# Patient Record
Sex: MALE | Race: WHITE | NOT HISPANIC OR LATINO | Employment: OTHER | URBAN - METROPOLITAN AREA
[De-identification: names, ages, dates, MRNs, and addresses within clinical notes are randomized per-mention and may not be internally consistent; named-entity substitution may affect disease eponyms.]

---

## 2017-12-05 ENCOUNTER — HOSPITAL ENCOUNTER (OUTPATIENT)
Dept: LAB | Facility: HOSPITAL | Age: 77
Setting detail: SPECIMEN
Discharge: HOME | End: 2017-12-05
Attending: INTERNAL MEDICINE | Admitting: INTERNAL MEDICINE

## 2017-12-05 LAB
ALBUMIN SERPL-MCNC: 3.7 G/DL (ref 3.5–4.8)
ALBUMIN/GLOB SERPL: 1.2 {RATIO} (ref 1–1.7)
ALP SERPL-CCNC: 60 IU/L (ref 32–91)
ALT SERPL-CCNC: 34 IU/L (ref 17–63)
ANION GAP SERPL CALC-SCNC: 9.8 MMOL/L (ref 10–20)
AST SERPL-CCNC: 31 IU/L (ref 15–41)
BILIRUB SERPL-MCNC: 0.7 MG/DL (ref 0.3–1.2)
BUN SERPL-MCNC: 20 MG/DL (ref 8–20)
BUN/CREAT SERPL: 22.2 (ref 6.2–20.3)
CALCIUM SERPL-MCNC: 8.9 MG/DL (ref 8.9–10.3)
CHLORIDE SERPL-SCNC: 102 MMOL/L (ref 101–111)
CHOLEST SERPL-MCNC: 156 MG/DL
CHOLEST/HDLC SERPL: 3 {RATIO}
CONV CO2: 30 MMOL/L (ref 22–32)
CONV LDL CHOLESTEROL DIRECT: 85 MG/DL (ref 0–100)
CONV MICROALBUM.,U,RANDOM: 124 MG/L
CONV TOTAL PROTEIN: 6.8 G/DL (ref 6.1–7.9)
CREAT 24H UR-MCNC: 129.6 MG/DL
CREAT UR-MCNC: 0.9 MG/DL (ref 0.7–1.2)
GLOBULIN UR ELPH-MCNC: 3.1 G/DL (ref 2.5–3.8)
GLUCOSE SERPL-MCNC: 99 MG/DL (ref 65–99)
HDLC SERPL-MCNC: 53 MG/DL
LDLC/HDLC SERPL: 1.6 {RATIO}
LIPID INTERPRETATION: NORMAL
MICROALBUMIN/CREAT UR: 95.7 UG/MG
POTASSIUM SERPL-SCNC: 3.8 MMOL/L (ref 3.6–5.1)
SODIUM SERPL-SCNC: 138 MMOL/L (ref 136–144)
TRIGL SERPL-MCNC: 106 MG/DL
TSH SERPL-ACNC: 1.31 UIU/ML (ref 0.34–5.6)
VLDLC SERPL CALC-MCNC: 17.9 MG/DL

## 2017-12-06 ENCOUNTER — CONVERSION ENCOUNTER (OUTPATIENT)
Dept: ENDOCRINOLOGY | Facility: CLINIC | Age: 77
End: 2017-12-06

## 2018-05-07 ENCOUNTER — HOSPITAL ENCOUNTER (OUTPATIENT)
Dept: LAB | Facility: HOSPITAL | Age: 78
Setting detail: SPECIMEN
Discharge: HOME | End: 2018-05-07
Attending: INTERNAL MEDICINE | Admitting: INTERNAL MEDICINE

## 2018-05-07 LAB
ALBUMIN SERPL-MCNC: 3.4 G/DL (ref 3.5–4.8)
ALBUMIN/GLOB SERPL: 1.2 {RATIO} (ref 1–1.7)
ALP SERPL-CCNC: 55 IU/L (ref 32–91)
ALT SERPL-CCNC: 27 IU/L (ref 17–63)
ANION GAP SERPL CALC-SCNC: 8.9 MMOL/L (ref 10–20)
AST SERPL-CCNC: 26 IU/L (ref 15–41)
BILIRUB SERPL-MCNC: 0.5 MG/DL (ref 0.3–1.2)
BUN SERPL-MCNC: 20 MG/DL (ref 8–20)
BUN/CREAT SERPL: 22.2 (ref 6.2–20.3)
CALCIUM SERPL-MCNC: 8.6 MG/DL (ref 8.9–10.3)
CHLORIDE SERPL-SCNC: 102 MMOL/L (ref 101–111)
CHOLEST SERPL-MCNC: 148 MG/DL
CHOLEST/HDLC SERPL: 3 {RATIO}
CONV CO2: 31 MMOL/L (ref 22–32)
CONV LDL CHOLESTEROL DIRECT: 82 MG/DL (ref 0–100)
CONV MICROALBUM.,U,RANDOM: 24 MG/L
CONV TOTAL PROTEIN: 6.2 G/DL (ref 6.1–7.9)
CREAT 24H UR-MCNC: 22.3 MG/DL
CREAT UR-MCNC: 0.9 MG/DL (ref 0.7–1.2)
GLOBULIN UR ELPH-MCNC: 2.8 G/DL (ref 2.5–3.8)
GLUCOSE SERPL-MCNC: 250 MG/DL (ref 65–99)
HDLC SERPL-MCNC: 50 MG/DL
LDLC/HDLC SERPL: 1.6 {RATIO}
LIPID INTERPRETATION: NORMAL
MICROALBUMIN/CREAT UR: 107.6 UG/MG
POTASSIUM SERPL-SCNC: 3.9 MMOL/L (ref 3.6–5.1)
SODIUM SERPL-SCNC: 138 MMOL/L (ref 136–144)
TRIGL SERPL-MCNC: 91 MG/DL
VLDLC SERPL CALC-MCNC: 16.6 MG/DL

## 2018-05-14 ENCOUNTER — CONVERSION ENCOUNTER (OUTPATIENT)
Dept: ENDOCRINOLOGY | Facility: CLINIC | Age: 78
End: 2018-05-14

## 2018-08-22 ENCOUNTER — CONVERSION ENCOUNTER (OUTPATIENT)
Dept: ENDOCRINOLOGY | Facility: CLINIC | Age: 78
End: 2018-08-22

## 2018-08-22 ENCOUNTER — HOSPITAL ENCOUNTER (OUTPATIENT)
Dept: LAB | Facility: HOSPITAL | Age: 78
Setting detail: SPECIMEN
Discharge: HOME | End: 2018-08-22
Attending: INTERNAL MEDICINE | Admitting: INTERNAL MEDICINE

## 2018-08-22 LAB — GLUCOSE SERPL-MCNC: 154 MG/DL (ref 65–99)

## 2018-10-10 ENCOUNTER — CONVERSION ENCOUNTER (OUTPATIENT)
Dept: ENDOCRINOLOGY | Facility: CLINIC | Age: 78
End: 2018-10-10

## 2019-01-02 ENCOUNTER — CONVERSION ENCOUNTER (OUTPATIENT)
Dept: ENDOCRINOLOGY | Facility: CLINIC | Age: 79
End: 2019-01-02

## 2019-06-04 VITALS
HEART RATE: 61 BPM | HEIGHT: 68 IN | BODY MASS INDEX: 35.16 KG/M2 | SYSTOLIC BLOOD PRESSURE: 122 MMHG | DIASTOLIC BLOOD PRESSURE: 58 MMHG | HEART RATE: 65 BPM | BODY MASS INDEX: 35.77 KG/M2 | OXYGEN SATURATION: 95 % | DIASTOLIC BLOOD PRESSURE: 55 MMHG | HEART RATE: 72 BPM | DIASTOLIC BLOOD PRESSURE: 50 MMHG | BODY MASS INDEX: 34.56 KG/M2 | WEIGHT: 236 LBS | DIASTOLIC BLOOD PRESSURE: 52 MMHG | SYSTOLIC BLOOD PRESSURE: 132 MMHG | SYSTOLIC BLOOD PRESSURE: 120 MMHG | HEIGHT: 68 IN | OXYGEN SATURATION: 99 % | WEIGHT: 242 LBS | SYSTOLIC BLOOD PRESSURE: 112 MMHG | DIASTOLIC BLOOD PRESSURE: 78 MMHG | HEART RATE: 63 BPM | SYSTOLIC BLOOD PRESSURE: 122 MMHG | OXYGEN SATURATION: 98 % | WEIGHT: 228 LBS | HEART RATE: 58 BPM | HEIGHT: 68 IN | WEIGHT: 232 LBS | OXYGEN SATURATION: 98 % | WEIGHT: 234 LBS | BODY MASS INDEX: 35.46 KG/M2 | HEIGHT: 68 IN

## 2019-07-01 DIAGNOSIS — I10 HYPERTENSION, UNSPECIFIED TYPE: ICD-10-CM

## 2019-07-01 DIAGNOSIS — E55.9 VITAMIN D DEFICIENCY: ICD-10-CM

## 2019-07-01 DIAGNOSIS — E10.49 TYPE 1 DIABETES MELLITUS WITH OTHER DIABETIC NEUROLOGICAL COMPLICATION (HCC): Primary | ICD-10-CM

## 2019-07-01 DIAGNOSIS — E78.2 HYPERLIPIDEMIA, MIXED: ICD-10-CM

## 2019-07-01 PROBLEM — Z82.5 FAMILY HISTORY OF ASTHMA: Status: ACTIVE | Noted: 2019-07-01

## 2019-07-08 ENCOUNTER — TELEPHONE (OUTPATIENT)
Dept: ENDOCRINOLOGY | Facility: CLINIC | Age: 79
End: 2019-07-08

## 2019-07-09 ENCOUNTER — LAB (OUTPATIENT)
Dept: LAB | Facility: HOSPITAL | Age: 79
End: 2019-07-09

## 2019-07-09 DIAGNOSIS — E10.49 TYPE 1 DIABETES MELLITUS WITH OTHER DIABETIC NEUROLOGICAL COMPLICATION (HCC): ICD-10-CM

## 2019-07-09 DIAGNOSIS — E78.2 HYPERLIPIDEMIA, MIXED: ICD-10-CM

## 2019-07-09 DIAGNOSIS — I10 HYPERTENSION, UNSPECIFIED TYPE: ICD-10-CM

## 2019-07-09 DIAGNOSIS — E55.9 VITAMIN D DEFICIENCY: ICD-10-CM

## 2019-07-09 LAB
ALBUMIN SERPL-MCNC: 3.6 G/DL (ref 3.5–4.8)
ALBUMIN/GLOB SERPL: 1.2 G/DL (ref 1–1.7)
ALP SERPL-CCNC: 57 U/L (ref 32–91)
ALT SERPL W P-5'-P-CCNC: 30 U/L (ref 17–63)
ANION GAP SERPL CALCULATED.3IONS-SCNC: 14.4 MMOL/L (ref 5–15)
ARTICHOKE IGE QN: 64 MG/DL (ref 0–100)
AST SERPL-CCNC: 26 U/L (ref 15–41)
BILIRUB SERPL-MCNC: 1 MG/DL (ref 0.3–1.2)
BUN BLD-MCNC: 20 MG/DL (ref 8–20)
BUN/CREAT SERPL: 20 (ref 6.2–20.3)
CALCIUM SPEC-SCNC: 9 MG/DL (ref 8.9–10.3)
CHLORIDE SERPL-SCNC: 101 MMOL/L (ref 101–111)
CHOLEST SERPL-MCNC: 116 MG/DL
CO2 SERPL-SCNC: 27 MMOL/L (ref 22–32)
CREAT BLD-MCNC: 1 MG/DL (ref 0.7–1.2)
GFR SERPL CREATININE-BSD FRML MDRD: 72 ML/MIN/1.73
GLOBULIN UR ELPH-MCNC: 3.1 GM/DL (ref 2.5–3.8)
GLUCOSE BLD-MCNC: 286 MG/DL (ref 65–99)
HBA1C MFR BLD: 7.6 % (ref 3.5–5.6)
HDLC SERPL QL: 2.37
HDLC SERPL-MCNC: 49 MG/DL
LDLC/HDLC SERPL: 1.11 {RATIO}
POTASSIUM BLD-SCNC: 4.4 MMOL/L (ref 3.6–5.1)
PROT SERPL-MCNC: 6.7 G/DL (ref 6.1–7.9)
SODIUM BLD-SCNC: 138 MMOL/L (ref 136–144)
TRIGL SERPL-MCNC: 62 MG/DL
TSH SERPL DL<=0.05 MIU/L-ACNC: 1.18 MIU/ML (ref 0.34–5.6)
VLDLC SERPL-MCNC: 12.4 MG/DL

## 2019-07-09 PROCEDURE — 80061 LIPID PANEL: CPT | Performed by: INTERNAL MEDICINE

## 2019-07-09 PROCEDURE — 80053 COMPREHEN METABOLIC PANEL: CPT | Performed by: INTERNAL MEDICINE

## 2019-07-09 PROCEDURE — 84443 ASSAY THYROID STIM HORMONE: CPT | Performed by: INTERNAL MEDICINE

## 2019-07-09 PROCEDURE — 36415 COLL VENOUS BLD VENIPUNCTURE: CPT

## 2019-07-09 PROCEDURE — 83036 HEMOGLOBIN GLYCOSYLATED A1C: CPT | Performed by: INTERNAL MEDICINE

## 2019-07-09 PROCEDURE — 82306 VITAMIN D 25 HYDROXY: CPT | Performed by: INTERNAL MEDICINE

## 2019-07-10 LAB — 25(OH)D3 SERPL-MCNC: 93.7 NG/ML (ref 30–100)

## 2019-07-15 ENCOUNTER — OFFICE VISIT (OUTPATIENT)
Dept: ENDOCRINOLOGY | Facility: CLINIC | Age: 79
End: 2019-07-15

## 2019-07-15 VITALS
SYSTOLIC BLOOD PRESSURE: 128 MMHG | OXYGEN SATURATION: 97 % | HEART RATE: 58 BPM | DIASTOLIC BLOOD PRESSURE: 58 MMHG | WEIGHT: 224 LBS | BODY MASS INDEX: 35.16 KG/M2 | HEIGHT: 67 IN

## 2019-07-15 DIAGNOSIS — E78.2 HYPERLIPIDEMIA, MIXED: ICD-10-CM

## 2019-07-15 DIAGNOSIS — E10.49 TYPE 1 DIABETES MELLITUS WITH OTHER DIABETIC NEUROLOGICAL COMPLICATION (HCC): Primary | ICD-10-CM

## 2019-07-15 DIAGNOSIS — E10.42 DIABETIC PERIPHERAL NEUROPATHY ASSOCIATED WITH TYPE 1 DIABETES MELLITUS (HCC): ICD-10-CM

## 2019-07-15 DIAGNOSIS — E55.9 VITAMIN D DEFICIENCY: ICD-10-CM

## 2019-07-15 PROBLEM — E10.9: Status: ACTIVE | Noted: 2017-07-05

## 2019-07-15 PROBLEM — I50.30 DIASTOLIC HEART FAILURE: Status: ACTIVE | Noted: 2017-07-05

## 2019-07-15 PROBLEM — M62.3 IMMOBILITY SYNDROME: Status: ACTIVE | Noted: 2017-07-05

## 2019-07-15 PROBLEM — Z98.61 S/P PTCA (PERCUTANEOUS TRANSLUMINAL CORONARY ANGIOPLASTY): Status: ACTIVE | Noted: 2017-07-03

## 2019-07-15 PROBLEM — R80.9 MICROALBUMINURIA: Status: ACTIVE | Noted: 2018-10-22

## 2019-07-15 PROBLEM — J45.20 MILD INTERMITTENT ASTHMA WITHOUT COMPLICATION: Status: ACTIVE | Noted: 2018-10-22

## 2019-07-15 PROBLEM — R10.10 UPPER ABDOMINAL PAIN: Status: ACTIVE | Noted: 2018-05-06

## 2019-07-15 PROBLEM — Z97.3 WEARS GLASSES: Status: ACTIVE | Noted: 2017-07-05

## 2019-07-15 PROCEDURE — 99214 OFFICE O/P EST MOD 30 MIN: CPT | Performed by: INTERNAL MEDICINE

## 2019-07-15 RX ORDER — TAMSULOSIN HYDROCHLORIDE 0.4 MG/1
1 CAPSULE ORAL DAILY
COMMUNITY

## 2019-07-15 RX ORDER — ASPIRIN 325 MG
81 TABLET ORAL DAILY
COMMUNITY
End: 2020-01-22

## 2019-07-15 RX ORDER — ATORVASTATIN CALCIUM 40 MG/1
40 TABLET, FILM COATED ORAL DAILY
COMMUNITY
End: 2021-08-16 | Stop reason: SDUPTHER

## 2019-07-15 RX ORDER — ESOMEPRAZOLE MAGNESIUM 40 MG/1
40 CAPSULE, DELAYED RELEASE ORAL 2 TIMES DAILY
COMMUNITY

## 2019-07-15 RX ORDER — FUROSEMIDE 40 MG/1
40 TABLET ORAL DAILY
COMMUNITY

## 2019-07-15 RX ORDER — ERGOCALCIFEROL 1.25 MG/1
50000 CAPSULE ORAL WEEKLY
COMMUNITY
End: 2020-01-06 | Stop reason: SDUPTHER

## 2019-07-15 RX ORDER — BUDESONIDE 0.5 MG/2ML
0.5 INHALANT ORAL 2 TIMES DAILY
COMMUNITY

## 2019-07-15 RX ORDER — HYDRALAZINE HYDROCHLORIDE 50 MG/1
50 TABLET, FILM COATED ORAL 3 TIMES DAILY
COMMUNITY

## 2019-07-15 RX ORDER — ALBUTEROL SULFATE 90 UG/1
2 AEROSOL, METERED RESPIRATORY (INHALATION) AS NEEDED
COMMUNITY
End: 2021-05-17

## 2019-07-15 RX ORDER — NITROGLYCERIN 400 UG/1
1 SPRAY ORAL
COMMUNITY
End: 2022-02-23 | Stop reason: SDUPTHER

## 2019-07-15 NOTE — PROGRESS NOTES
[facility]        Patient Care Team:  Diane Garcia MD as PCP - General    Chief Complaint:    Chief Complaint   Patient presents with   • Diabetes     type 1         Subjective   Here for diabetes f/u  Blood sugars higher since on steroids for COPD exacerbation.  Checks blood sugars 4x/d for the last 3 months. Adjusts insulin dose based on results.  Injects insulin 4x/d for the last 6 months.  Exercise program: using walker  Taking vit D 2d/week.    Interval History:     Patient Complaints: doesn't like the small needle - difficult to use  Patient Denies: hypoglycemia  History taken from: patient    Review of Systems:   Review of Systems   Eyes: Negative for blurred vision.   Gastrointestinal: Negative for nausea.   Endocrine: Negative for polyuria.   Neurological: Negative for headache.     Lost  4 lb since last visit    Objective     Vital Signs  Heart Rate:  [58] 58  BP: (128)/(58) 128/58    Physical Exam:     General Appearance:    Alert, cooperative, in no acute distress. Obese   Head:    Normocephalic, without obvious abnormality, atraumatic   Eyes:            Lids and lashes normal, conjunctivae and sclerae normal, no   icterus, no pallor, corneas clear, PERRLA   Throat:   No oral lesions,  oral mucosa moist   Neck:   No adenopathy, supple,  no thyromegaly, no   carotid bruit   Lungs:     decreased breath sounds    Heart:    Regular rhythm and normal rate   Chest Wall:    No abnormalities observed   Abdomen:     Normal bowel sounds, soft                 Extremities:   Moves all extremities well, trace edema, charcot feet, plantar calluses, hammer toes. Atrophy of intrinsic hand muscles               Pulses:   Pulses palpable and equal bilaterally   Skin:   Dry.    Neurologic:  DTR absent, unable to feel the 10g monofilament          Results Review:    I have reviewed the patient's new clinical results, labs & imaging.    Medication Review:   Prior to Admission medications     Medication Sig Start Date End Date Taking? Authorizing Provider   albuterol sulfate  (90 Base) MCG/ACT inhaler Inhale 2 puffs As Needed for Wheezing.   Yes Noni Torrez MD   aspirin 325 MG tablet Take 81 mg by mouth Daily.   Yes Noni Torrez MD   atorvastatin (LIPITOR) 20 MG tablet Take 20 mg by mouth Daily.   Yes Noni Torrez MD   budesonide (PULMICORT) 0.5 MG/2ML nebulizer solution Take 0.5 mg by nebulization 3 (Three) Times a Day.   Yes Noni Torrez MD   esomeprazole (nexIUM) 40 MG capsule Take 40 mg by mouth 2 (Two) Times a Day.   Yes Noni Torrez MD   furosemide (LASIX) 40 MG tablet Take 40 mg by mouth Daily.   Yes Noni Torrez MD   glucose blood test strip 1 each by Other route As Needed (one touch verio test strips  6 times daily). Use as instructed   Yes Noni Torrez MD   hydrALAZINE (APRESOLINE) 50 MG tablet Take 50 mg by mouth 2 (Two) Times a Day.   Yes Noni Torrez MD   Insulin Glargine (LANTUS SOLOSTAR) 100 UNIT/ML injection pen Inject 37 Units under the skin into the appropriate area as directed Every Night. 7/8/19  Yes Diane Garcia MD   Insulin Glargine (LANTUS SOLOSTAR) 100 UNIT/ML injection pen Inject 36 Units under the skin into the appropriate area as directed Daily.   Yes Noni Torrez MD   Insulin Lispro (HUMALOG KWIKPEN) 100 UNIT/ML solution pen-injector Sliding scale max daily dose 75units 1/23/15  Yes Noni Torrez MD   Insulin Pen Needle (ULTILET PEN NEEDLE) 29G X 12.7MM misc    Yes Noni Torrez MD   insulin regular (humuLIN R,novoLIN R) 100 UNIT/ML injection Inject  under the skin into the appropriate area as directed 3 (Three) Times a Day Before Meals. 3 units for each 10 mg of prednisone   Yes Noni Torrez MD   isosorbide mononitrate (ISMO,MONOKET) 20 MG tablet Take 20 mg by mouth Daily.   Yes Noni Torrez MD   metoprolol tartrate (LOPRESSOR) 25 MG tablet Take  25 mg by mouth 2 (Two) Times a Day.   Yes Noni Torrez MD   nitroglycerin (NITROLINGUAL) 0.4 MG/SPRAY spray Place 1 spray under the tongue Every 5 (Five) Minutes As Needed for Chest Pain (1 spray every 5 min as needed).   Yes Noni Torrez MD   tamsulosin (FLOMAX) 0.4 MG capsule 24 hr capsule Take 1 capsule by mouth Daily.   Yes Noni Torrez MD   tiotropium (SPIRIVA) 18 MCG per inhalation capsule Place 1 capsule into inhaler and inhale Daily. Two times inhalation once daily   Yes Noni Torrez MD   vitamin D (ERGOCALCIFEROL) 22051 units capsule capsule Take 50,000 Units by mouth 1 (One) Time Per Week.   Yes Noni Torrez MD   Insulin Pen Needle 29G X 12.7MM misc Use with insulin 6 times daily 7/15/19   Diane Garcia MD       Lab Results (most recent)     None            Lab Results   Component Value Date    HGBA1C 7.6 (H) 07/09/2019    HGBA1C 7.7 (H) 06/11/2019    HGBA1C 7.4 (H) 12/26/2018      Lab Results   Component Value Date    GLUCOSE 286 (H) 07/09/2019    BUN 20 07/09/2019    CREATININE 1.00 07/09/2019    EGFRIFNONA 72 07/09/2019    BCR 20.0 07/09/2019    K 4.4 07/09/2019    CO2 27.0 07/09/2019    CALCIUM 9.0 07/09/2019    ALBUMIN 3.60 07/09/2019    LABIL2 1.4 06/11/2019    AST 26 07/09/2019    ALT 30 07/09/2019    CHOL 116 07/09/2019    LDL 64 07/09/2019    HDL 49 07/09/2019    TRIG 62 07/09/2019     Lab Results   Component Value Date    TSH 1.180 07/09/2019    TSEQ44BI 93.7 07/09/2019       Assessment/Plan     Jered was seen today for diabetes.    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  Decrease vit D to once a week.        Diane Garcia MD  07/15/19  11:52 AM

## 2019-07-15 NOTE — PATIENT INSTRUCTIONS
Keep up the good work!  Increase Humulin R insulin dose to 3 units per 10 mg.  Decrease vit D to once a week.  F/u in 6 months, with labs prior.

## 2019-07-19 RX ORDER — INSULIN GLARGINE 100 [IU]/ML
39 INJECTION, SOLUTION SUBCUTANEOUS NIGHTLY
Qty: 45 ML | Refills: 3 | Status: SHIPPED | OUTPATIENT
Start: 2019-07-19 | End: 2020-06-08

## 2019-07-19 RX ORDER — INSULIN LISPRO 100 [IU]/ML
INJECTION, SOLUTION INTRAVENOUS; SUBCUTANEOUS
Qty: 60 ML | Refills: 3 | Status: SHIPPED | OUTPATIENT
Start: 2019-07-19 | End: 2020-10-09

## 2019-07-19 RX ORDER — LANCETS
EACH MISCELLANEOUS
Qty: 400 EACH | Refills: 3 | Status: SHIPPED | OUTPATIENT
Start: 2019-07-19 | End: 2019-08-01 | Stop reason: SDUPTHER

## 2019-08-01 RX ORDER — LANCETS
EACH MISCELLANEOUS
Qty: 400 EACH | Refills: 3 | Status: SHIPPED | OUTPATIENT
Start: 2019-08-01 | End: 2019-08-08

## 2019-08-02 RX ORDER — BLOOD-GLUCOSE METER
EACH MISCELLANEOUS
Qty: 1 KIT | Refills: 3 | Status: SHIPPED | OUTPATIENT
Start: 2019-08-02 | End: 2019-08-06 | Stop reason: CLARIF

## 2019-08-02 NOTE — TELEPHONE ENCOUNTER
Pharmacy was confused on what has been sent in vs what the patient had requested. Clarified with pharmacy and sent in.

## 2019-08-06 RX ORDER — LANCETS
EACH MISCELLANEOUS
Qty: 150 EACH | Refills: 6 | Status: SHIPPED | OUTPATIENT
Start: 2019-08-06 | End: 2019-08-08

## 2019-08-08 ENCOUNTER — TELEPHONE (OUTPATIENT)
Dept: ENDOCRINOLOGY | Facility: CLINIC | Age: 79
End: 2019-08-08

## 2019-08-08 RX ORDER — LANCETS
EACH MISCELLANEOUS
Qty: 150 EACH | Refills: 5 | Status: SHIPPED | OUTPATIENT
Start: 2019-08-08 | End: 2020-07-27

## 2019-08-28 ENCOUNTER — TELEPHONE (OUTPATIENT)
Dept: ENDOCRINOLOGY | Facility: CLINIC | Age: 79
End: 2019-08-28

## 2019-08-28 NOTE — TELEPHONE ENCOUNTER
Spoke with patient about smoothies he has been making that were running BG up. After he stopped them yesterday his BG better. Asking if it was smoothie. We discussed ingredients and suggested he try the berries or banana and peanut butter but not all the ingredients all in same soothie as this had elevated sugar. He agreed and was going to try different combo of items to see.

## 2019-09-10 ENCOUNTER — TELEPHONE (OUTPATIENT)
Dept: ENDOCRINOLOGY | Facility: CLINIC | Age: 79
End: 2019-09-10

## 2019-09-23 ENCOUNTER — TELEPHONE (OUTPATIENT)
Dept: ENDOCRINOLOGY | Facility: CLINIC | Age: 79
End: 2019-09-23

## 2019-10-08 ENCOUNTER — TELEPHONE (OUTPATIENT)
Dept: ENDOCRINOLOGY | Facility: CLINIC | Age: 79
End: 2019-10-08

## 2019-10-15 ENCOUNTER — TELEPHONE (OUTPATIENT)
Dept: ENDOCRINOLOGY | Facility: CLINIC | Age: 79
End: 2019-10-15

## 2019-11-01 ENCOUNTER — TELEPHONE (OUTPATIENT)
Dept: ENDOCRINOLOGY | Facility: CLINIC | Age: 79
End: 2019-11-01

## 2019-11-11 ENCOUNTER — TELEPHONE (OUTPATIENT)
Dept: ENDOCRINOLOGY | Facility: CLINIC | Age: 79
End: 2019-11-11

## 2019-11-12 ENCOUNTER — TELEPHONE (OUTPATIENT)
Dept: ENDOCRINOLOGY | Facility: CLINIC | Age: 79
End: 2019-11-12

## 2019-11-14 RX ORDER — PROCHLORPERAZINE 25 MG/1
1 SUPPOSITORY RECTAL CONTINUOUS
Qty: 1 EACH | Refills: 3 | Status: SHIPPED | OUTPATIENT
Start: 2019-11-14 | End: 2019-12-13 | Stop reason: SDUPTHER

## 2019-11-14 RX ORDER — PROCHLORPERAZINE 25 MG/1
1 SUPPOSITORY RECTAL
Qty: 3 EACH | Refills: 3 | Status: SHIPPED | OUTPATIENT
Start: 2019-11-14 | End: 2019-12-13 | Stop reason: SDUPTHER

## 2019-11-14 RX ORDER — PROCHLORPERAZINE 25 MG/1
1 SUPPOSITORY RECTAL CONTINUOUS
Qty: 1 DEVICE | Refills: 0 | Status: SHIPPED | OUTPATIENT
Start: 2019-11-14 | End: 2019-12-13 | Stop reason: SDUPTHER

## 2019-11-14 RX ORDER — PROCHLORPERAZINE 25 MG/1
1 SUPPOSITORY RECTAL
COMMUNITY
End: 2019-11-14 | Stop reason: SDUPTHER

## 2019-12-02 ENCOUNTER — TELEPHONE (OUTPATIENT)
Dept: ENDOCRINOLOGY | Facility: CLINIC | Age: 79
End: 2019-12-02

## 2019-12-04 ENCOUNTER — TELEPHONE (OUTPATIENT)
Dept: ENDOCRINOLOGY | Facility: CLINIC | Age: 79
End: 2019-12-04

## 2019-12-04 NOTE — TELEPHONE ENCOUNTER
Pt called to clarify his Humulin R dose for steroids.  His MD put pt on Prednisone 5 mg tabs instead of 10 mg tabs.  Pt emailed attempted dosing but adjusted per MD s/o to 1 unit of Humulin R for every 5 mg tab of prednisone.  See scanned attachment from email from pt.  Pt states his insurance does not cover Humulin R so he goes to [employer] and purchases OOP.

## 2019-12-09 ENCOUNTER — TELEPHONE (OUTPATIENT)
Dept: ENDOCRINOLOGY | Facility: CLINIC | Age: 79
End: 2019-12-09

## 2019-12-10 ENCOUNTER — TELEPHONE (OUTPATIENT)
Dept: ENDOCRINOLOGY | Facility: CLINIC | Age: 79
End: 2019-12-10

## 2019-12-10 NOTE — TELEPHONE ENCOUNTER
Called pt. He says he is not sure why but didn't get the email from us.  Instructed patient to give 33 units Lantus pm and give 4units Lantus in am on Steroid days. He agreed to do this tonight as he had already done 35 last pm and 2 this am.  States he hopes to upgrade to Dexcom G6 in January and would like to come in for training then.  To call when receives the new supplies

## 2019-12-13 RX ORDER — PROCHLORPERAZINE 25 MG/1
1 SUPPOSITORY RECTAL CONTINUOUS
Qty: 1 DEVICE | Refills: 0 | Status: SHIPPED | OUTPATIENT
Start: 2019-12-13 | End: 2020-01-20

## 2019-12-13 RX ORDER — PROCHLORPERAZINE 25 MG/1
1 SUPPOSITORY RECTAL
Qty: 3 EACH | Refills: 3 | Status: SHIPPED | OUTPATIENT
Start: 2019-12-13 | End: 2020-01-20

## 2019-12-13 RX ORDER — PROCHLORPERAZINE 25 MG/1
1 SUPPOSITORY RECTAL CONTINUOUS
Qty: 1 EACH | Refills: 3 | Status: SHIPPED | OUTPATIENT
Start: 2019-12-13 | End: 2020-01-20

## 2019-12-13 NOTE — TELEPHONE ENCOUNTER
Dexcom G6 supplies sent to [facility] since he doesn't deal with [facility]  Pt notified.

## 2019-12-16 NOTE — TELEPHONE ENCOUNTER
[facility] faxed a PA request for pt's Dexcom supplies.  This means pt has to go through a DME instead of a local pharmacy.

## 2019-12-23 ENCOUNTER — TELEPHONE (OUTPATIENT)
Dept: ENDOCRINOLOGY | Facility: CLINIC | Age: 79
End: 2019-12-23

## 2019-12-31 ENCOUNTER — TELEPHONE (OUTPATIENT)
Dept: ENDOCRINOLOGY | Facility: CLINIC | Age: 79
End: 2019-12-31

## 2020-01-03 ENCOUNTER — TELEPHONE (OUTPATIENT)
Dept: ENDOCRINOLOGY | Facility: CLINIC | Age: 80
End: 2020-01-03

## 2020-01-03 NOTE — TELEPHONE ENCOUNTER
Pt called c/o lack of appetite due to being on steroids for asthma. Discussed using a liquid/soft diet such as a homemade smoothie or pudding, Jell-O, applesauce, yogurt, etc for meals and sip on non-calorie beverages between.  Pt states he will probably use his homemade smoothies that have almond milk, 1/2 banana, PNB and low carb yogurt.

## 2020-01-06 RX ORDER — ERGOCALCIFEROL 1.25 MG/1
50000 CAPSULE ORAL WEEKLY
Qty: 12 CAPSULE | Refills: 3 | Status: SHIPPED | OUTPATIENT
Start: 2020-01-06 | End: 2020-01-09 | Stop reason: SDUPTHER

## 2020-01-06 RX ORDER — CHOLECALCIFEROL (VITAMIN D3) 1250 MCG
CAPSULE ORAL
Qty: 24 CAPSULE | Refills: 0 | Status: SHIPPED | OUTPATIENT
Start: 2020-01-06 | End: 2020-01-16

## 2020-01-08 ENCOUNTER — TELEPHONE (OUTPATIENT)
Dept: ENDOCRINOLOGY | Facility: CLINIC | Age: 80
End: 2020-01-08

## 2020-01-09 RX ORDER — ERGOCALCIFEROL 1.25 MG/1
50000 CAPSULE ORAL WEEKLY
Qty: 12 CAPSULE | Refills: 3 | Status: SHIPPED | OUTPATIENT
Start: 2020-01-09 | End: 2020-07-27 | Stop reason: SDUPTHER

## 2020-01-09 RX ORDER — MULTIVIT WITH MINERALS/LUTEIN
1000 TABLET ORAL DAILY
COMMUNITY
Start: 2015-11-18

## 2020-01-09 RX ORDER — LOSARTAN POTASSIUM 100 MG/1
100 TABLET ORAL DAILY
COMMUNITY
Start: 2019-05-23 | End: 2020-01-22

## 2020-01-09 RX ORDER — IBUPROFEN 200 MG
TABLET ORAL
COMMUNITY
Start: 2018-12-28 | End: 2020-01-23 | Stop reason: SDUPTHER

## 2020-01-09 RX ORDER — POTASSIUM CHLORIDE 750 MG/1
TABLET, FILM COATED, EXTENDED RELEASE ORAL DAILY
COMMUNITY
Start: 2015-11-18

## 2020-01-09 NOTE — TELEPHONE ENCOUNTER
Pt called again asking about his BG's that he emailed.  Replied to pt's email that Kirsten rec'd no change. Transferred VM about his Vit. D to Katherine.

## 2020-01-11 ENCOUNTER — RESULTS ENCOUNTER (OUTPATIENT)
Dept: ENDOCRINOLOGY | Facility: CLINIC | Age: 80
End: 2020-01-11

## 2020-01-11 DIAGNOSIS — E10.49 TYPE 1 DIABETES MELLITUS WITH OTHER DIABETIC NEUROLOGICAL COMPLICATION (HCC): ICD-10-CM

## 2020-01-15 ENCOUNTER — TELEPHONE (OUTPATIENT)
Dept: ENDOCRINOLOGY | Facility: CLINIC | Age: 80
End: 2020-01-15

## 2020-01-15 DIAGNOSIS — E10.65 TYPE 1 DIABETES MELLITUS WITH HYPERGLYCEMIA (HCC): Primary | ICD-10-CM

## 2020-01-15 NOTE — TELEPHONE ENCOUNTER
Pt emailed recent BG's.  Per MD s/o, instructed no changes.  Pt states he will be getting the Dexcom G6 soon and will need to schedule training.  Scheduled for 1/28 at 1 pm.

## 2020-01-16 RX ORDER — CHOLECALCIFEROL (VITAMIN D3) 1250 MCG
CAPSULE ORAL
Qty: 24 CAPSULE | Refills: 0 | Status: SHIPPED | OUTPATIENT
Start: 2020-01-16 | End: 2021-01-18

## 2020-01-20 RX ORDER — PROCHLORPERAZINE 25 MG/1
1 SUPPOSITORY RECTAL CONTINUOUS
Qty: 1 DEVICE | Refills: 0 | Status: SHIPPED | OUTPATIENT
Start: 2020-01-20 | End: 2023-02-21 | Stop reason: SDUPTHER

## 2020-01-20 RX ORDER — PROCHLORPERAZINE 25 MG/1
1 SUPPOSITORY RECTAL CONTINUOUS
Qty: 1 EACH | Refills: 3 | Status: SHIPPED | OUTPATIENT
Start: 2020-01-20 | End: 2020-12-14

## 2020-01-20 RX ORDER — PROCHLORPERAZINE 25 MG/1
1 SUPPOSITORY RECTAL
Qty: 3 EACH | Refills: 3 | Status: SHIPPED | OUTPATIENT
Start: 2020-01-20 | End: 2020-10-05

## 2020-01-22 ENCOUNTER — OFFICE VISIT (OUTPATIENT)
Dept: ENDOCRINOLOGY | Facility: CLINIC | Age: 80
End: 2020-01-22

## 2020-01-22 VITALS
HEART RATE: 66 BPM | SYSTOLIC BLOOD PRESSURE: 126 MMHG | WEIGHT: 217 LBS | BODY MASS INDEX: 34.06 KG/M2 | HEIGHT: 67 IN | OXYGEN SATURATION: 97 % | DIASTOLIC BLOOD PRESSURE: 68 MMHG

## 2020-01-22 DIAGNOSIS — E78.2 HYPERLIPIDEMIA, MIXED: ICD-10-CM

## 2020-01-22 DIAGNOSIS — E10.49 TYPE 1 DIABETES MELLITUS WITH OTHER DIABETIC NEUROLOGICAL COMPLICATION (HCC): Primary | ICD-10-CM

## 2020-01-22 DIAGNOSIS — E55.9 VITAMIN D DEFICIENCY: ICD-10-CM

## 2020-01-22 DIAGNOSIS — E66.2 CLASS 2 OBESITY WITH ALVEOLAR HYPOVENTILATION, SERIOUS COMORBIDITY, AND BODY MASS INDEX (BMI) OF 37.0 TO 37.9 IN ADULT (HCC): ICD-10-CM

## 2020-01-22 PROCEDURE — 99214 OFFICE O/P EST MOD 30 MIN: CPT | Performed by: INTERNAL MEDICINE

## 2020-01-22 RX ORDER — FORMOTEROL FUMARATE 20 UG/2ML
SOLUTION RESPIRATORY (INHALATION)
COMMUNITY

## 2020-01-22 RX ORDER — POTASSIUM CHLORIDE 20 MEQ/1
20 TABLET, EXTENDED RELEASE ORAL DAILY
COMMUNITY
Start: 2020-01-14 | End: 2020-01-22

## 2020-01-22 RX ORDER — HYDRALAZINE HYDROCHLORIDE 25 MG/1
25 TABLET, FILM COATED ORAL 2 TIMES DAILY
COMMUNITY
Start: 2019-11-27 | End: 2020-01-23 | Stop reason: SDUPTHER

## 2020-01-22 RX ORDER — IPRATROPIUM BROMIDE AND ALBUTEROL SULFATE 2.5; .5 MG/3ML; MG/3ML
1 SOLUTION RESPIRATORY (INHALATION)
COMMUNITY

## 2020-01-22 NOTE — PATIENT INSTRUCTIONS
Keep up the good work!  Increase exercise as able.  Decrease vit D to every other week.  Continue other meds.  Call if blood sugars are running under 100 or over 200.  F/u in 6 months, with labs prior.

## 2020-01-23 NOTE — PROGRESS NOTES
[facility]        Patient Care Team:  Diane Garcia MD as PCP - General    Chief Complaint:    Chief Complaint   Patient presents with   • Diabetes     type 1 - pt refused BS- dexcom shows 174         Subjective   Here for diabetes f/u  Blood sugars: in the high 100's  Checks blood sugars 4x/d for the last 3 months. Adjusts insulin dose per sliding scale based on results  Injects insulin 4x/d for the last 6 months  Exercise program: up & down steps    Interval History:     Patient Complaints: none  Patient Denies:  hypoglycemia  History taken from: patient    Review of Systems:   Review of Systems   Eyes: Negative for blurred vision.   Gastrointestinal: Negative for nausea.   Endocrine: Negative for polyuria.   Neurological: Negative for headache.     Lost  7 lb since last visit    Objective     Vital Signs      Vitals:    01/22/20 1043   BP: 126/68   Pulse: 66   SpO2: 97%         Physical Exam:     General Appearance:    Alert, cooperative, in no acute distress. Obese   Head:    Normocephalic, without obvious abnormality, atraumatic   Eyes:            Lids and lashes normal, conjunctivae and sclerae normal, no   icterus, no pallor, corneas clear, PERRLA   Throat:   No oral lesions,  oral mucosa moist   Neck:   No adenopathy, supple,  no thyromegaly, no   carotid bruit   Lungs:     Decreased breath sounds    Heart:    Regular rhythm and normal rate   Chest Wall:    No abnormalities observed   Abdomen:     Normal bowel sounds, soft                 Extremities:   Charcot feet, hammer toes, trace edema               Pulses:   Pulses palpable and equal bilaterally   Skin:   Dry. Stasis dermatitis.   Neurologic:  DTR absent in toes, absent vibratory sense in toes, unable to feel the 10g monofilament ( same as 1y ago )            Results Review:    I have reviewed the patient's new clinical results, labs & imaging.    Medication Review:   Prior to Admission medications    Medication Sig Start  Date End Date Taking? Authorizing Provider   ACCU-CHEK COMPACT TEST DRUM test strip Use as instructed to check blood sugar four times daily dx:E10.65 8/6/19  Yes Diane Garcia MD   ACCU-CHEK SOFTCLIX LANCETS lancets Use to check blood sugar four times daily dx: e10.65 8/8/19 8/7/20 Yes Diane Garcia MD   acetone, urine, test (KETOSTIX) strip KETOSTIX STRP 8/2/18  Yes Noni Torrez MD   albuterol sulfate  (90 Base) MCG/ACT inhaler Inhale 2 puffs As Needed for Wheezing.   Yes Noni Torrez MD   ascorbic acid (VITAMIN C) 1000 MG tablet Take 1,000 mg by mouth Daily. 11/18/15  Yes Noni Torrez MD   atorvastatin (LIPITOR) 40 MG tablet Take 40 mg by mouth Daily.   Yes Noni Torrez MD   Blood Glucose Monitoring Suppl (ACCU-CHEK COMPACT CARE KIT) kit Use to check blood sugars four times daily dx:e10.65 8/6/19  Yes Diane Garcia MD   budesonide (PULMICORT) 0.5 MG/2ML nebulizer solution Take 0.5 mg by nebulization 2 (Two) Times a Day.   Yes Noni Torrez MD   Cholecalciferol (VITAMIN D3) 1.25 MG (86027 UT) capsule TAKE 1 CAPSULE BY MOUTH TWICE A WEEK 1/16/20  Yes Diane Garcia MD   Continuous Blood Gluc  (DEXCOM G6 ) device 1 each Continuous. Pt to use to monitor his blood sugars. 1/20/20  Yes Diane Garcia MD   Continuous Blood Gluc Sensor (DEXCOM G6 SENSOR) 1 each Every 10 (Ten) Days. Pt to replace sensor every 10 days. 1/20/20  Yes Diane Garcia MD   Continuous Blood Gluc Transmit (DEXCOM G6 TRANSMITTER) misc 1 each Continuous. Pt to use to monitor his blood sugars 1/20/20  Yes Diane Garcia MD   esomeprazole (nexIUM) 40 MG capsule Take 40 mg by mouth 2 (Two) Times a Day.   Yes Noni Torrez MD   fluticasone (VERAMYST) 27.5 MCG/SPRAY nasal spray 2 sprays into the nostril(s) as directed by provider Daily. 12/26/19 12/25/20 Yes Noni Torrez, MD   formoterol (PERFOROMIST) 20 MCG/2ML nebulizer solution Take  by  nebulization 2 (Two) Times a Day.   Yes Noni Torrez MD   furosemide (LASIX) 40 MG tablet Take 40 mg by mouth Daily.   Yes Noni Torrez MD   glucagon (GLUCAGEN HYPOKIT) 1 MG injection GLUCAGEN HYPOKIT 1 MG SOLR 9/20/18  Yes Noni Torrez MD   glucose blood test strip Use to check 4 times daily Dx code: E10.65 8/6/19  Yes Diane Garcia MD   HUMALOG KWIKPEN 100 UNIT/ML solution pen-injector Sliding scale max daily dose 55 units dx:e10.65  Patient taking differently: Inject per Sliding scale five shots daily , max daily dose 55 units dx:e10.65 7/19/19  Yes Diane Garcia MD   hydrALAZINE (APRESOLINE) 50 MG tablet Take 50 mg by mouth 3 (Three) Times a Day.   Yes Noni Torrez MD   Insulin Pen Needle 29G X 12.7MM misc Use with insulin 6 times daily  Patient taking differently: Use with insulin 7 times daily 7/15/19  Yes Diane Garcia MD   ipratropium-albuterol (DUO-NEB) 0.5-2.5 mg/3 ml nebulizer Inhale 1 ampule.   Yes Noni Torrez MD   ISOSORBIDE MONONITRATE PO Take 30 mg by mouth Daily.   Yes Noni Torrez MD   LANTUS SOLOSTAR 100 UNIT/ML injection pen Inject 39 Units under the skin into the appropriate area as directed Every Night. Inject 40 units at bedtime dx:e10.65  Patient taking differently: Inject 34 Units under the skin into the appropriate area as directed Every Night. dx:e10.65 7/19/19  Yes Diane Garcia MD   metoprolol tartrate (LOPRESSOR) 25 MG tablet Take 25 mg by mouth 2 (Two) Times a Day.   Yes Noni Torrez MD   nitroglycerin (NITROLINGUAL) 0.4 MG/SPRAY spray Place 1 spray under the tongue Every 5 (Five) Minutes As Needed for Chest Pain (1 spray every 5 min as needed).   Yes Noni oTrrez MD   potassium chloride (K-DUR) 10 MEQ CR tablet Daily. 11/18/15  Yes Noni Torrez MD   tamsulosin (FLOMAX) 0.4 MG capsule 24 hr capsule Take 1 capsule by mouth Daily.   Yes Noni Torrez MD   tiotropium (SPIRIVA) 18  "MCG per inhalation capsule Place 1 capsule into inhaler and inhale Daily. Two times inhalation once daily   Yes Noni Torrez MD   vitamin D (ERGOCALCIFEROL) 1.25 MG (99082 UT) capsule capsule Take 1 capsule by mouth 1 (One) Time Per Week. 1/9/20  Yes Diane Garcia MD   hydrALAZINE (APRESOLINE) 25 MG tablet Take 25 mg by mouth 2 (Two) Times a Day. 11/27/19   Noni Torrez MD   Insulin Pen Needle (ULTILET PEN NEEDLE) 29G X 12.7MM misc     Noni Torrez MD   Insulin Syringe (B-D INS SYR ULTRAFINE .5CC/29G) 29G X 1/2\" 0.5 ML misc BD INSULIN SYRINGE ULTRAFINE 29G X 1/2\" 0.5 ML 12/28/18   Noni Torrez MD       Lab Results (most recent)     None            Lab Results   Component Value Date    HGBA1C 7.7 (H) 01/15/2020    HGBA1C 7.6 (H) 07/09/2019    HGBA1C 7.7 (H) 06/11/2019      Lab Results   Component Value Date    GLUCOSE 286 (H) 07/09/2019    BUN 24 (H) 01/15/2020    CREATININE 0.8 01/15/2020    EGFRIFNONA 72 07/09/2019    BCR 29.0 (H) 01/15/2020    K 4.4 01/15/2020    CO2 26 01/15/2020    CALCIUM 9.1 01/15/2020    ALBUMIN 3.6 01/15/2020    LABIL2 1.2 01/15/2020    AST 41 01/15/2020    ALT 33 01/15/2020    CHOL 116 07/09/2019    LDL 64 01/15/2020    HDL 52 01/15/2020    TRIG 72 01/15/2020     Lab Results   Component Value Date    TSH 1.180 07/09/2019    [id number] >60 01/15/2020      TSH 0.9; microalb/cr [id number]; Chol 130, Trigl 72 on 1/15/2020    Assessment/Plan     Jered was seen today for diabetes.    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    Increase exercise as able.  Decrease vit D to every other week.  Continue other meds.  Call if blood sugars are running under 100 or over 200.          Diane Garcia MD  01/23/20  5:12 PM

## 2020-01-28 ENCOUNTER — OFFICE VISIT (OUTPATIENT)
Dept: ENDOCRINOLOGY | Facility: CLINIC | Age: 80
End: 2020-01-28

## 2020-01-28 DIAGNOSIS — E10.65 TYPE 1 DIABETES MELLITUS WITH HYPERGLYCEMIA (HCC): ICD-10-CM

## 2020-01-28 PROCEDURE — G0108 DIAB MANAGE TRN  PER INDIV: HCPCS | Performed by: DIETITIAN, REGISTERED

## 2020-02-10 ENCOUNTER — TELEPHONE (OUTPATIENT)
Dept: ENDOCRINOLOGY | Facility: CLINIC | Age: 80
End: 2020-02-10

## 2020-02-11 NOTE — TELEPHONE ENCOUNTER
Pt called back and said he did not get the email from [employer]. Reviewed email recommendations and pt asked he could change his ICR of 70 to 90 grams range to 1 unit to 3 grams also.  Pt asked if he could continue to email BG's on spreadsheet instead of using Dexcom clarity.  Pt has not been able to get that clementina on his phone.  He either does not know his apple ID and/or Dexcom acct username and password. Agreed with the above.

## 2020-02-24 ENCOUNTER — TELEPHONE (OUTPATIENT)
Dept: ENDOCRINOLOGY | Facility: CLINIC | Age: 80
End: 2020-02-24

## 2020-03-09 ENCOUNTER — TELEPHONE (OUTPATIENT)
Dept: ENDOCRINOLOGY | Facility: CLINIC | Age: 80
End: 2020-03-09

## 2020-03-09 NOTE — TELEPHONE ENCOUNTER
Pt emailed recent BG's.  Pt plans on going to fitness center 3 days per week.  Pt states he did not take his Humalog with breakfast before going to gym and he did not have a low BG. Told pt he is notices elevated BG after exercise, he may have to take a small amount of Humalog with breakfast.  Pt asked about other low BG treatments.  Discussed using 4-6 oz of grape juice.  Pt can't tolerated OJ and does not think a regular soda helps.  Told pt a treatment may still take up to 10-15 minutes to work.  Pt verbalized an understanding.

## 2020-04-09 ENCOUNTER — TELEPHONE (OUTPATIENT)
Dept: ENDOCRINOLOGY | Facility: CLINIC | Age: 80
End: 2020-04-09

## 2020-04-23 ENCOUNTER — TELEPHONE (OUTPATIENT)
Dept: ENDOCRINOLOGY | Facility: CLINIC | Age: 80
End: 2020-04-23

## 2020-04-27 ENCOUNTER — TELEPHONE (OUTPATIENT)
Dept: ENDOCRINOLOGY | Facility: CLINIC | Age: 80
End: 2020-04-27

## 2020-04-27 NOTE — TELEPHONE ENCOUNTER
Called pt and per MD s/o, instructed no changes.  Pt verbalized an understanding. BG's scanned into phone note

## 2020-05-08 ENCOUNTER — TELEPHONE (OUTPATIENT)
Dept: ENDOCRINOLOGY | Facility: CLINIC | Age: 80
End: 2020-05-08

## 2020-05-14 ENCOUNTER — TELEPHONE (OUTPATIENT)
Dept: ENDOCRINOLOGY | Facility: CLINIC | Age: 80
End: 2020-05-14

## 2020-05-14 NOTE — TELEPHONE ENCOUNTER
Pt states egg substitutes are getting too expensive and wanted to know if it's okay to eat 2 eggs per day.  Called pt back and told pt it's okay to eat 2 eggs a day.  His lipid panel is well managed.

## 2020-05-22 ENCOUNTER — TELEPHONE (OUTPATIENT)
Dept: ENDOCRINOLOGY | Facility: CLINIC | Age: 80
End: 2020-05-22

## 2020-05-22 NOTE — TELEPHONE ENCOUNTER
Scanned BG's into phone note.  Per MD s/o, instructed no changes.  Pt verbalized an understanding.

## 2020-06-08 ENCOUNTER — TELEPHONE (OUTPATIENT)
Dept: ENDOCRINOLOGY | Facility: CLINIC | Age: 80
End: 2020-06-08

## 2020-06-08 RX ORDER — INSULIN GLARGINE 100 [IU]/ML
INJECTION, SOLUTION SUBCUTANEOUS
Qty: 30 ML | Refills: 3 | Status: SHIPPED | OUTPATIENT
Start: 2020-06-08 | End: 2021-07-28

## 2020-06-08 NOTE — TELEPHONE ENCOUNTER
Updated chart with how much pt is taking of his Lantus.  BG's scanned into phone note.  Per MD s/o, changed his ICR from 30-49 grams to 1 unit for every 3.5 grams.

## 2020-07-10 ENCOUNTER — TELEPHONE (OUTPATIENT)
Dept: ENDOCRINOLOGY | Facility: CLINIC | Age: 80
End: 2020-07-10

## 2020-07-20 ENCOUNTER — RESULTS ENCOUNTER (OUTPATIENT)
Dept: ENDOCRINOLOGY | Facility: CLINIC | Age: 80
End: 2020-07-20

## 2020-07-20 DIAGNOSIS — E55.9 VITAMIN D DEFICIENCY: ICD-10-CM

## 2020-07-20 DIAGNOSIS — E10.49 TYPE 1 DIABETES MELLITUS WITH OTHER DIABETIC NEUROLOGICAL COMPLICATION (HCC): ICD-10-CM

## 2020-07-27 ENCOUNTER — OFFICE VISIT (OUTPATIENT)
Dept: ENDOCRINOLOGY | Facility: CLINIC | Age: 80
End: 2020-07-27

## 2020-07-27 VITALS
HEIGHT: 68 IN | HEART RATE: 65 BPM | TEMPERATURE: 98 F | SYSTOLIC BLOOD PRESSURE: 110 MMHG | BODY MASS INDEX: 32.46 KG/M2 | DIASTOLIC BLOOD PRESSURE: 50 MMHG | OXYGEN SATURATION: 98 % | WEIGHT: 214.2 LBS

## 2020-07-27 DIAGNOSIS — E10.49 TYPE 1 DIABETES MELLITUS WITH OTHER DIABETIC NEUROLOGICAL COMPLICATION (HCC): Primary | ICD-10-CM

## 2020-07-27 DIAGNOSIS — E55.9 VITAMIN D DEFICIENCY: ICD-10-CM

## 2020-07-27 DIAGNOSIS — I10 ESSENTIAL HYPERTENSION: ICD-10-CM

## 2020-07-27 DIAGNOSIS — E78.5 HYPERLIPIDEMIA, UNSPECIFIED HYPERLIPIDEMIA TYPE: ICD-10-CM

## 2020-07-27 PROBLEM — N18.1 CKD STAGE 1 DUE TO TYPE 1 DIABETES MELLITUS (HCC): Status: ACTIVE | Noted: 2018-10-22

## 2020-07-27 PROBLEM — Z78.9 ENROLLED IN CHRONIC CARE MANAGEMENT: Status: ACTIVE | Noted: 2020-06-30

## 2020-07-27 PROBLEM — J45.909 ASTHMA: Status: ACTIVE | Noted: 2019-07-01

## 2020-07-27 PROBLEM — E10.22 CKD STAGE 1 DUE TO TYPE 1 DIABETES MELLITUS (HCC): Status: ACTIVE | Noted: 2018-10-22

## 2020-07-27 PROCEDURE — 99214 OFFICE O/P EST MOD 30 MIN: CPT | Performed by: INTERNAL MEDICINE

## 2020-07-27 RX ORDER — LANCETS
EACH MISCELLANEOUS
Qty: 100 EACH | Refills: 3 | Status: SHIPPED | OUTPATIENT
Start: 2020-07-27 | End: 2020-12-14 | Stop reason: CLARIF

## 2020-07-27 RX ORDER — LOSARTAN POTASSIUM 100 MG/1
100 TABLET ORAL DAILY
COMMUNITY
Start: 2020-03-09

## 2020-07-27 RX ORDER — FINASTERIDE 1 MG/1
1 TABLET, FILM COATED ORAL DAILY
COMMUNITY
Start: 2020-05-26 | End: 2021-05-26

## 2020-07-27 RX ORDER — PANTOPRAZOLE SODIUM 40 MG/1
TABLET, DELAYED RELEASE ORAL
COMMUNITY
Start: 2020-07-21

## 2020-07-27 NOTE — PATIENT INSTRUCTIONS
Decrease vit D to one every other week.  Increase exercise as able.  Ok to use meal substitute for 2 meals a day.  Continue meds.  Call if blood sugars are running under 100 or over 200.  F/u in 6 months, with labs prior.

## 2020-07-28 NOTE — PROGRESS NOTES
[facility]        Patient Care Team:  Diane Garcia MD as PCP - General    Chief Complaint:    Chief Complaint   Patient presents with   • Diabetes     TYPE 1 - (FASTING) 194 (2 POST MEAL)         Subjective   Here for diabetes f/u  Blood sugars: in the high 100's  Exercise program: not much, hasn't been going to the gym  New new meter    Interval History:     Patient Complaints: none  Patient Denies:  hypoglycemia  History taken from: patient    Review of Systems:   Review of Systems   Eyes: Negative for blurred vision.   Gastrointestinal: Negative for nausea.   Endocrine: Negative for polyuria.   Neurological: Negative for headache.     Lost  3 lb since last visit    Objective     Vital Signs  Temp:  [98 °F (36.7 °C)] 98 °F (36.7 °C)  Heart Rate:  [65] 65  BP: (110)/(50) 110/50    Physical Exam:     General Appearance:    Alert, cooperative, in no acute distress. Obese   Head:    Normocephalic, without obvious abnormality, atraumatic   Eyes:            Lids and lashes normal, conjunctivae and sclerae normal, no   icterus, no pallor, corneas clear, PERRLA   Throat:   No oral lesions,  oral mucosa moist   Neck:   No adenopathy, supple,  no thyromegaly, no   carotid bruit   Lungs:     Clear     Heart:    Regular rhythm and normal rate   Chest Wall:    No abnormalities observed   Abdomen:     Normal bowel sounds, soft                 Extremities:   Hammer toes, no edema               Pulses:   Pulses palpable and equal bilaterally   Skin:   Dry. Toe nails need trimming   Neurologic:  DTR absent, able to feel the 10g monofilament          Results Review:    I have reviewed the patient's new clinical results, labs & imaging.    Medication Review:   Prior to Admission medications    Medication Sig Start Date End Date Taking? Authorizing Provider   acetone, urine, test (KETOSTIX) strip KETOSTIX STRP 8/2/18  Yes Provider, MD Noni   albuterol sulfate  (90 Base) MCG/ACT inhaler  Inhale 2 puffs As Needed for Wheezing.   Yes Noni Torrez MD   ascorbic acid (VITAMIN C) 1000 MG tablet Take 1,000 mg by mouth Daily. 11/18/15  Yes Noni Torrez MD   Aspirin Buf,CaCarb-MgCarb-MgO, 81 MG tablet Take 81 mg by mouth Daily.   Yes Noni Torrez MD   atorvastatin (LIPITOR) 40 MG tablet Take 40 mg by mouth Daily.   Yes Noni Torrez MD   budesonide (PULMICORT) 0.5 MG/2ML nebulizer solution Take 0.5 mg by nebulization 2 (Two) Times a Day.   Yes Noni Torrez MD   Cholecalciferol (VITAMIN D3) 1.25 MG (63405 UT) capsule TAKE 1 CAPSULE BY MOUTH TWICE A WEEK 1/16/20  Yes Diane Garcia MD   Continuous Blood Gluc  (DEXCOM G6 ) device 1 each Continuous. Pt to use to monitor his blood sugars. 1/20/20  Yes Diane Garcia MD   Continuous Blood Gluc Sensor (DEXCOM G6 SENSOR) 1 each Every 10 (Ten) Days. Pt to replace sensor every 10 days. 1/20/20  Yes Diane Garcia MD   Continuous Blood Gluc Transmit (DEXCOM G6 TRANSMITTER) misc 1 each Continuous. Pt to use to monitor his blood sugars 1/20/20  Yes Diane Garcia MD   esomeprazole (nexIUM) 40 MG capsule Take 40 mg by mouth 2 (Two) Times a Day.   Yes Noni Torrez MD   finasteride (PROPECIA) 1 MG tablet Take 1 mg by mouth Daily. 5/26/20 5/26/21 Yes Noni Torrez MD   fluticasone (VERAMYST) 27.5 MCG/SPRAY nasal spray 2 sprays into the nostril(s) as directed by provider Daily. 12/26/19 12/25/20 Yes Noni Torrez MD   formoterol (PERFOROMIST) 20 MCG/2ML nebulizer solution Take  by nebulization 2 (Two) Times a Day.   Yes Noni Torrez MD   furosemide (LASIX) 40 MG tablet Take 40 mg by mouth Daily.   Yes Noni Torrez MD   glucagon (GLUCAGEN HYPOKIT) 1 MG injection GLUCAGEN HYPOKIT 1 MG SOLR 9/20/18  Yes Noni Torrez MD   HUMALOG KWIKPEN 100 UNIT/ML solution pen-injector Sliding scale max daily dose 55 units dx:e10.65  Patient taking differently: Inject  per Sliding scale five shots daily , max daily dose 55 units dx:e10.65 7/19/19  Yes Diane Garcia MD   hydrALAZINE (APRESOLINE) 50 MG tablet Take 50 mg by mouth 3 (Three) Times a Day.   Yes Noni Torrez MD   Insulin Pen Needle 29G X 12.7MM misc Use with insulin 6 times daily  Patient taking differently: Use with insulin 7 times daily 7/15/19  Yes Diane Garcia MD   ipratropium-albuterol (DUO-NEB) 0.5-2.5 mg/3 ml nebulizer Inhale 1 ampule.   Yes Noni Torrez MD   ISOSORBIDE MONONITRATE PO Take 30 mg by mouth Daily.   Yes Noni Torrez MD   LANTUS SOLOSTAR 100 UNIT/ML injection pen Inject 29 units at bedtime dx:e10.65  Patient taking differently: Inject UP TO 40 units at bedtime dx:e10.65 6/8/20  Yes Diane Garcia MD   losartan (COZAAR) 100 MG tablet Take 100 mg by mouth Daily. 3/9/20  Yes Noni Torrez MD   metoprolol tartrate (LOPRESSOR) 25 MG tablet Take 25 mg by mouth 2 (Two) Times a Day.   Yes Noni Torrez MD   pantoprazole (PROTONIX) 40 MG EC tablet Take 1 tablet by mouth twice daily 7/21/20  Yes Noni Torrez MD   potassium chloride (K-DUR) 10 MEQ CR tablet Daily. 11/18/15  Yes Noni Torrez MD   tamsulosin (FLOMAX) 0.4 MG capsule 24 hr capsule Take 1 capsule by mouth Daily.   Yes Noni Torrez MD   tiotropium (SPIRIVA) 18 MCG per inhalation capsule Place 1 capsule into inhaler and inhale Daily. Two times inhalation once daily   Yes Noni Torrez MD   glucose blood test strip Use to check 4 times daily Dx code: E10.65 8/6/19 7/27/20 Yes Diane Garcia MD   Accu-Chek FastClix Lancets misc For finger stick daily 7/27/20   Diane Garcia MD   Blood Glucose Monitoring Suppl (ACCU-CHEK COMPACT CARE KIT) kit Use to check blood sugars four times daily dx:e10.65 8/6/19   Diane Garcia MD   glucose blood (Accu-Chek Guide) test strip To test blood sugar daily 7/27/20   Diane Garcia MD   nitroglycerin  (NITROLINGUAL) 0.4 MG/SPRAY spray Place 1 spray under the tongue Every 5 (Five) Minutes As Needed for Chest Pain (1 spray every 5 min as needed).    Provider, MD Noni   ACCU-CHEK COMPACT TEST DRUM test strip Use as instructed to check blood sugar four times daily dx:E10.65 8/6/19 7/27/20  Diane Garcia MD   ACCU-CHEK SOFTCLIX LANCETS lancets Use to check blood sugar four times daily dx: e10.65 8/8/19 7/27/20  Diane Garcia MD   vitamin D (ERGOCALCIFEROL) 1.25 MG (29993 UT) capsule capsule Take 1 capsule by mouth 1 (One) Time Per Week. 1/9/20 7/27/20  Diane Garcia MD       Lab Results (most recent)     None        Lab Results   Component Value Date    HGBA1C 8.2 (H) 07/21/2020    HGBA1C 7.7 (H) 01/15/2020    HGBA1C 7.6 (H) 07/09/2019      Lab Results   Component Value Date    GLUCOSE 286 (H) 07/09/2019    BUN 40 (H) 07/21/2020    CREATININE 0.9 07/21/2020    EGFRIFNONA 72 07/09/2019    BCR 44.0 (H) 07/21/2020    K 4.6 07/21/2020    CO2 27 07/21/2020    CALCIUM 9.1 07/21/2020    ALBUMIN 3.6 01/15/2020    LABIL2 1.2 01/15/2020    AST 41 01/15/2020    ALT 33 01/15/2020    CHOL 116 07/09/2019    LDL 95 07/21/2020    HDL 55 07/21/2020    TRIG 109 07/21/2020     Lab Results   Component Value Date    TSH 1.180 07/09/2019    PHMS01OT >60 07/21/2020       Assessment/Plan     Jered was seen today for diabetes.    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  Increase exercise as able.  Ok to use meal substitute for 2 meals a day.  Continue meds.  Call if blood sugars are running under 100 or over 200.    This pt  has type 1 Diabetes.  He injects insulin 5x/d.  This patient's insulin treatment regimen requires frequent adjustments by the patient on the basis of therapeutic CGM testing results.    Diane Garcia MD  07/27/20  21:22

## 2020-08-03 ENCOUNTER — TELEPHONE (OUTPATIENT)
Dept: ENDOCRINOLOGY | Facility: CLINIC | Age: 80
End: 2020-08-03

## 2020-08-03 NOTE — TELEPHONE ENCOUNTER
Can you please addend the OV on 07/27/20 to state:  1. Pt's diagnosis  2. How often they are administering insulin  3. The patient's insulin treatment regimen requires frequent adjustments by the patient on the basis of therapeutic cgm testing results      Thank you!

## 2020-09-08 ENCOUNTER — TELEPHONE (OUTPATIENT)
Dept: ENDOCRINOLOGY | Facility: CLINIC | Age: 80
End: 2020-09-08

## 2020-10-01 DIAGNOSIS — E10.49 TYPE 1 DIABETES MELLITUS WITH OTHER DIABETIC NEUROLOGICAL COMPLICATION (HCC): Primary | ICD-10-CM

## 2020-10-02 RX ORDER — PEN NEEDLE, DIABETIC 29 G X1/2"
NEEDLE, DISPOSABLE MISCELLANEOUS
Qty: 600 EACH | Refills: 2 | Status: SHIPPED | OUTPATIENT
Start: 2020-10-02 | End: 2021-07-19

## 2020-10-05 ENCOUNTER — TELEPHONE (OUTPATIENT)
Dept: ENDOCRINOLOGY | Facility: CLINIC | Age: 80
End: 2020-10-05

## 2020-10-05 RX ORDER — PROCHLORPERAZINE 25 MG/1
1 SUPPOSITORY RECTAL
Qty: 3 EACH | Refills: 3 | Status: SHIPPED | OUTPATIENT
Start: 2020-10-05 | End: 2020-10-05 | Stop reason: SDUPTHER

## 2020-10-05 RX ORDER — PROCHLORPERAZINE 25 MG/1
1 SUPPOSITORY RECTAL
Qty: 3 EACH | Refills: 3 | Status: SHIPPED | OUTPATIENT
Start: 2020-10-05 | End: 2020-10-14 | Stop reason: SDUPTHER

## 2020-10-07 ENCOUNTER — TELEPHONE (OUTPATIENT)
Dept: ENDOCRINOLOGY | Facility: CLINIC | Age: 80
End: 2020-10-07

## 2020-10-07 NOTE — TELEPHONE ENCOUNTER
Pt states Medicare is still not allowing him to get his Dexcom sensors through [employer] on [address].  Emailed Dexcom reps asking if I need to call medicare [phone] or if that is something Dex[employer] can do.

## 2020-10-08 ENCOUNTER — TELEPHONE (OUTPATIENT)
Dept: ENDOCRINOLOGY | Facility: CLINIC | Age: 80
End: 2020-10-08

## 2020-10-09 RX ORDER — INSULIN LISPRO 100 [IU]/ML
INJECTION, SOLUTION INTRAVENOUS; SUBCUTANEOUS
Qty: 60 ML | Refills: 0 | Status: SHIPPED | OUTPATIENT
Start: 2020-10-09 | End: 2021-02-02

## 2020-10-14 DIAGNOSIS — E10.49 TYPE 1 DIABETES MELLITUS WITH OTHER DIABETIC NEUROLOGICAL COMPLICATION (HCC): Primary | ICD-10-CM

## 2020-10-14 RX ORDER — PROCHLORPERAZINE 25 MG/1
SUPPOSITORY RECTAL
Qty: 3 EACH | Refills: 3 | Status: SHIPPED | OUTPATIENT
Start: 2020-10-14 | End: 2021-01-11 | Stop reason: SDUPTHER

## 2020-11-13 ENCOUNTER — TELEPHONE (OUTPATIENT)
Dept: ENDOCRINOLOGY | Facility: CLINIC | Age: 80
End: 2020-11-13

## 2020-11-13 NOTE — TELEPHONE ENCOUNTER
Patient emailed in blood sugars. Patient states they are from Oct. No changes made. Asked patient to send in more recent blood sugars. Blood Sugars scanned into phone note.

## 2020-11-17 ENCOUNTER — TELEPHONE (OUTPATIENT)
Dept: ENDOCRINOLOGY | Facility: CLINIC | Age: 80
End: 2020-11-17

## 2020-11-17 RX ORDER — URINE ACETONE TEST STRIPS
STRIP MISCELLANEOUS
Qty: 50 STRIP | Refills: 1 | Status: SHIPPED | OUTPATIENT
Start: 2020-11-17

## 2020-11-17 RX ORDER — ONDANSETRON HYDROCHLORIDE 4 MG/5ML
4 SOLUTION ORAL ONCE
COMMUNITY
End: 2020-11-17

## 2020-11-17 RX ORDER — ONDANSETRON HYDROCHLORIDE 4 MG/1
4 TABLET, FILM COATED ORAL EVERY 8 HOURS PRN
COMMUNITY
End: 2020-11-17

## 2020-12-14 DIAGNOSIS — E10.65 UNCONTROLLED TYPE 1 DIABETES MELLITUS WITH HYPERGLYCEMIA (HCC): Primary | ICD-10-CM

## 2020-12-14 RX ORDER — CARVEDILOL 25 MG/1
1 TABLET, FILM COATED ORAL 3 TIMES DAILY
COMMUNITY
End: 2020-12-14 | Stop reason: SDUPTHER

## 2020-12-14 RX ORDER — PROCHLORPERAZINE 25 MG/1
SUPPOSITORY RECTAL
Qty: 1 EACH | Refills: 3 | Status: SHIPPED | OUTPATIENT
Start: 2020-12-14 | End: 2021-12-30

## 2020-12-14 RX ORDER — CARVEDILOL 25 MG/1
1 TABLET, FILM COATED ORAL 3 TIMES DAILY
Qty: 90 EACH | Refills: 3 | Status: SHIPPED | OUTPATIENT
Start: 2020-12-14 | End: 2021-01-18

## 2020-12-14 NOTE — TELEPHONE ENCOUNTER
Pt has had to call Dexcom a lot ready about failed sensors and transmitter. Pt having to calibrate twice daily.  Pt switched his glucometer to [employer] and asking for test strip rx. Rx ready for signature.

## 2020-12-18 PROBLEM — E10.3299 MILD NONPROLIFERATIVE DIABETIC RETINOPATHY WITHOUT MACULAR EDEMA ASSOCIATED WITH TYPE 1 DIABETES MELLITUS: Status: ACTIVE | Noted: 2020-12-18

## 2020-12-31 ENCOUNTER — TELEPHONE (OUTPATIENT)
Dept: ENDOCRINOLOGY | Facility: CLINIC | Age: 80
End: 2020-12-31

## 2020-12-31 NOTE — TELEPHONE ENCOUNTER
Betty sent a PA request for pt's Dexcom sensors.  Asked MELISA Paredes with [employer] about if pt needs to go through a DME.  He LVM on pt's phone about using [place] and faxed us the ppw to get started. ppw on MD desk for signature and then will be faxed to [employer] with recent OV.

## 2021-01-06 ENCOUNTER — TELEPHONE (OUTPATIENT)
Dept: ENDOCRINOLOGY | Facility: CLINIC | Age: 81
End: 2021-01-06

## 2021-01-06 DIAGNOSIS — E10.49 TYPE 1 DIABETES MELLITUS WITH OTHER DIABETIC NEUROLOGICAL COMPLICATION (HCC): ICD-10-CM

## 2021-01-11 RX ORDER — PROCHLORPERAZINE 25 MG/1
SUPPOSITORY RECTAL
Qty: 3 EACH | Refills: 3 | Status: SHIPPED | OUTPATIENT
Start: 2021-01-11 | End: 2021-05-17

## 2021-01-18 ENCOUNTER — TELEPHONE (OUTPATIENT)
Dept: ENDOCRINOLOGY | Facility: CLINIC | Age: 81
End: 2021-01-18

## 2021-01-18 ENCOUNTER — TELEMEDICINE (OUTPATIENT)
Dept: ENDOCRINOLOGY | Facility: CLINIC | Age: 81
End: 2021-01-18

## 2021-01-18 VITALS — WEIGHT: 199 LBS | BODY MASS INDEX: 30.16 KG/M2 | HEIGHT: 68 IN

## 2021-01-18 DIAGNOSIS — E10.49 TYPE 1 DIABETES MELLITUS WITH OTHER DIABETIC NEUROLOGICAL COMPLICATION (HCC): Primary | ICD-10-CM

## 2021-01-18 DIAGNOSIS — E78.2 HYPERLIPIDEMIA, MIXED: ICD-10-CM

## 2021-01-18 DIAGNOSIS — E55.9 VITAMIN D DEFICIENCY: ICD-10-CM

## 2021-01-18 PROCEDURE — 99214 OFFICE O/P EST MOD 30 MIN: CPT | Performed by: INTERNAL MEDICINE

## 2021-01-18 RX ORDER — CHOLECALCIFEROL (VITAMIN D3) 1250 MCG
50000 CAPSULE ORAL
Start: 2021-01-18 | End: 2021-04-06 | Stop reason: SDUPTHER

## 2021-01-18 NOTE — PATIENT INSTRUCTIONS
Keep up the good work!  Increase exercise as able.  Continue diabetes & chol meds & vit D supplements.  Call if blood sugars are running under 100 or over 200.  F/u in 6 months, with labs prior.

## 2021-01-23 NOTE — PROGRESS NOTES
[facility]        Patient Care Team:  Stefanie Blood APRN as PCP - General (Nurse Practitioner)    Chief Complaint:    Chief Complaint   Patient presents with   • Diabetes     type 1, bs 241 2.5h, insulin same         Subjective   Here for diabetes f/u  This pt consented to this telemedicine visit in view of the covid 19 pandemic, using phone only due to problems with connection  Blood sugars: in the high 100's  Checks blood sugars 4x/d for the last 3 months. Adjusts insulin dose per sliding scale based on results  Injects insulin 4x/d for the last 6 months  Having issues with DexCom requiring frequent calibration, having to do more finger sticks & replacing sensors  Exercise program: not much  Taking vit D    Interval History:     Patient Complaints: L 5th toe injury recently - wearing a boot  Patient Denies:  hypoglycemia  History taken from: patient    Review of Systems:   Review of Systems   HENT: Negative for trouble swallowing.    Eyes: Negative for blurred vision.   Gastrointestinal: Negative for nausea.   Endocrine: Negative for polyuria.   Neurological: Negative for headache.     Lost  15 lb since last visit    Objective     Vital Signs    There were no vitals filed for this visit.      Physical Exam: not done. eVisit          Results Review:    I have reviewed the patient's new clinical results, labs & imaging.    Medication Review:   Prior to Admission medications    Medication Sig Start Date End Date Taking? Authorizing Provider   acetone, urine, test (Ketostix) strip Pt to check his urine for ketones if his blood sugar is >250 or when ill 11/17/20  Yes Diane Garcia MD   albuterol sulfate  (90 Base) MCG/ACT inhaler Inhale 2 puffs As Needed for Wheezing.   Yes ProviderNoni MD   ascorbic acid (VITAMIN C) 1000 MG tablet Take 1,000 mg by mouth Daily. 11/18/15  Yes Noni Torrez MD   Aspirin Buf,CaCarb-MgCarb-MgO, 81 MG tablet Take 81 mg by mouth Daily.    Yes Noni Torrez MD   atorvastatin (LIPITOR) 40 MG tablet Take 40 mg by mouth Daily.   Yes Noni Torrez MD   budesonide (PULMICORT) 0.5 MG/2ML nebulizer solution Take 0.5 mg by nebulization 2 (Two) Times a Day.   Yes Noni Torrez MD   Cholecalciferol (Vitamin D3) 1.25 MG (79198 UT) capsule Take 1 capsule by mouth Every 14 (Fourteen) Days. 1/18/21  Yes Diane Garcia MD   Continuous Blood Gluc  (DEXCOM G6 ) device 1 each Continuous. Pt to use to monitor his blood sugars. 1/20/20  Yes Diane Garcia MD   Continuous Blood Gluc Sensor (Dexcom G6 Sensor) Pt to replace sensor every 10 days. See note to pharmacy for Medicare info. 1/11/21  Yes Diane Garcia MD   Continuous Blood Gluc Transmit (Dexcom G6 Transmitter) misc USE AS DIRECTED 12/14/20  Yes Diane Garcia MD   diclofenac (VOLTAREN) 1 % gel gel APPLY 4 GRAMS TOPICALLY 4 TIMES DAILY MAX OF 16 GRAMS PER JOINT PER DAY OR 32 GRAMS TOTAL PER DAY 10/23/20  Yes Noni Torrez MD   esomeprazole (nexIUM) 40 MG capsule Take 40 mg by mouth 2 (Two) Times a Day.   Yes Noni Torrez MD   finasteride (PROPECIA) 1 MG tablet Take 1 mg by mouth Daily. 5/26/20 5/26/21 Yes Noni Torrez MD   formoterol (PERFOROMIST) 20 MCG/2ML nebulizer solution Take  by nebulization 2 (Two) Times a Day.   Yes Noni Torrez MD   furosemide (LASIX) 40 MG tablet Take 40 mg by mouth Daily.   Yes Noni Torrez MD   glucagon (GLUCAGEN HYPOKIT) 1 MG injection GLUCAGEN HYPOKIT 1 MG SOLR 9/20/18  Yes Noni Torrez MD   HumaLOG KwikPen 100 UNIT/ML solution pen-injector INJECT PER SLIDING SCALE. MAX DAILY DOSE: 55 UNITS  DAILY 10/9/20  Yes Diane Garcia MD   hydrALAZINE (APRESOLINE) 50 MG tablet Take 50 mg by mouth 3 (Three) Times a Day.   Yes Noni Torrez MD   Insulin Pen Needle (BD ULTRA-FINE PEN NEEDLES) 29G X 12.7MM misc USE WITH INSULIN SIX TIMES DAILY 10/2/20  Yes Diane Garcia MD    ipratropium-albuterol (DUO-NEB) 0.5-2.5 mg/3 ml nebulizer Inhale 1 ampule. 3-4 times daily   Yes Noni Torrez MD   ISOSORBIDE MONONITRATE PO Take 30 mg by mouth Daily.   Yes Noni Torrez MD   LANDELGADO SOLOSTAR 100 UNIT/ML injection pen Inject 29 units at bedtime dx:e10.65  Patient taking differently: Inject UP TO 40 units at bedtime dx:e10.65 6/8/20  Yes Diane Garcia MD   losartan (COZAAR) 100 MG tablet Take 100 mg by mouth Daily. 3/9/20  Yes Noni Torrez MD   metoprolol tartrate (LOPRESSOR) 25 MG tablet Take 25 mg by mouth 2 (Two) Times a Day.   Yes Noni Torrez MD   nitroglycerin (NITROLINGUAL) 0.4 MG/SPRAY spray Place 1 spray under the tongue Every 5 (Five) Minutes As Needed for Chest Pain (1 spray every 5 min as needed).   Yes Noni Torrez MD   pantoprazole (PROTONIX) 40 MG EC tablet Take 1 tablet by mouth twice daily 7/21/20  Yes Noni Torrez MD   potassium chloride (K-DUR) 10 MEQ CR tablet Daily. 11/18/15  Yes Noni Torrez MD   tamsulosin (FLOMAX) 0.4 MG capsule 24 hr capsule Take 1 capsule by mouth Daily.   Yes Noni Torrez MD   tiotropium (SPIRIVA) 18 MCG per inhalation capsule Place 1 capsule into inhaler and inhale Daily. Two times inhalation once daily   Yes Noni Torrez MD   glucose blood test strip To test blood sugars 3x/d 1/18/21   Diane Garcia MD   Zofran ODT 4 MG disintegrating tablet Pt to place one tab under tongue every 12 hours as needed for nausea. Dx: E10.65 11/20/20   Diane Garcia MD       Lab Results (most recent)     None        Lab Results   Component Value Date    HGBA1C 7.9 (H) 01/04/2021    HGBA1C 8.2 (H) 07/21/2020    HGBA1C 7.7 (H) 01/15/2020      Lab Results   Component Value Date    GLUCOSE 286 (H) 07/09/2019    BUN 30 (H) 01/04/2021    CREATININE 0.9 01/04/2021    EGFRIFNONA 72 07/09/2019    BCR 32.0 (H) 01/04/2021    K 4.8 01/04/2021    CO2 29 01/04/2021    CALCIUM 9.3 01/04/2021     ALBUMIN 3.7 01/04/2021    LABIL2 1.2 01/04/2021    AST 34 01/04/2021    ALT 31 01/04/2021    CHOL 116 07/09/2019    LDL 95 07/21/2020    HDL 55 07/21/2020    TRIG 109 07/21/2020     Lab Results   Component Value Date    TSH 1.390 01/04/2021    [id number] 48 01/04/2021       Assessment/Plan     Diagnoses and all orders for this visit:    1. 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 Hyperlipidemia, mixed    3. Vitamin D deficiency  -     Cholecalciferol (Vitamin D3) 1.25 MG (03499 UT) capsule; Take 1 capsule by mouth Every 14 (Fourteen) Days.    Glucose control improved. Lipids & vit D stable.    Increase exercise as able.  Continue diabetes & chol meds & vit D supplements.  Call if blood sugars are running under 100 or over 200.  Will have DexCom rep contact pt.    Spent 30 min talking to pt.    Diane Garcia MD  01/23/21  14:57 EST

## 2021-02-02 RX ORDER — INSULIN LISPRO 100 [IU]/ML
INJECTION, SOLUTION INTRAVENOUS; SUBCUTANEOUS
Qty: 60 ML | Refills: 3 | Status: SHIPPED | OUTPATIENT
Start: 2021-02-02 | End: 2022-03-21

## 2021-02-08 ENCOUNTER — TELEPHONE (OUTPATIENT)
Dept: ENDOCRINOLOGY | Facility: CLINIC | Age: 81
End: 2021-02-08

## 2021-02-17 ENCOUNTER — TELEPHONE (OUTPATIENT)
Dept: ENDOCRINOLOGY | Facility: CLINIC | Age: 81
End: 2021-02-17

## 2021-02-17 NOTE — TELEPHONE ENCOUNTER
Faxed to [facility] for pt's CGM supplies.  Fax confirmation scanned into phone note

## 2021-02-25 ENCOUNTER — TELEPHONE (OUTPATIENT)
Dept: ENDOCRINOLOGY | Facility: CLINIC | Age: 81
End: 2021-02-25

## 2021-04-06 RX ORDER — ERGOCALCIFEROL 1.25 MG/1
CAPSULE ORAL
Qty: 6 CAPSULE | Refills: 3 | Status: SHIPPED | OUTPATIENT
Start: 2021-04-06 | End: 2022-02-04

## 2021-04-08 ENCOUNTER — TELEPHONE (OUTPATIENT)
Dept: ENDOCRINOLOGY | Facility: CLINIC | Age: 81
End: 2021-04-08

## 2021-05-17 DIAGNOSIS — E10.49 TYPE 1 DIABETES MELLITUS WITH OTHER DIABETIC NEUROLOGICAL COMPLICATION (HCC): ICD-10-CM

## 2021-05-17 RX ORDER — ALBUTEROL SULFATE 2.5 MG/3ML
SOLUTION RESPIRATORY (INHALATION)
COMMUNITY
Start: 2021-03-19

## 2021-05-17 RX ORDER — ISOSORBIDE MONONITRATE 60 MG/1
60 TABLET, EXTENDED RELEASE ORAL DAILY
COMMUNITY
Start: 2021-04-07

## 2021-05-17 RX ORDER — PROCHLORPERAZINE 25 MG/1
SUPPOSITORY RECTAL
Qty: 3 EACH | Refills: 11 | Status: SHIPPED | OUTPATIENT
Start: 2021-05-17 | End: 2022-06-13

## 2021-05-17 RX ORDER — TIOTROPIUM BROMIDE INHALATION SPRAY 3.12 UG/1
SPRAY, METERED RESPIRATORY (INHALATION)
COMMUNITY
Start: 2021-03-19

## 2021-05-17 RX ORDER — MEMANTINE HYDROCHLORIDE 5 MG/1
TABLET ORAL
COMMUNITY
Start: 2021-03-22

## 2021-05-17 RX ORDER — REVEFENACIN 175 UG/3ML
SOLUTION RESPIRATORY (INHALATION)
COMMUNITY
Start: 2021-04-22

## 2021-05-17 RX ORDER — ATORVASTATIN CALCIUM 20 MG/1
20 TABLET, FILM COATED ORAL DAILY
COMMUNITY
Start: 2021-04-07

## 2021-05-17 RX ORDER — HYDRALAZINE HYDROCHLORIDE 25 MG/1
TABLET, FILM COATED ORAL
COMMUNITY
Start: 2021-04-03

## 2021-05-18 RX ORDER — LANCETS
EACH MISCELLANEOUS
Qty: 102 EACH | Refills: 5 | Status: SHIPPED | OUTPATIENT
Start: 2021-05-18

## 2021-05-21 ENCOUNTER — TELEPHONE (OUTPATIENT)
Dept: ENDOCRINOLOGY | Facility: CLINIC | Age: 81
End: 2021-05-21

## 2021-05-21 NOTE — TELEPHONE ENCOUNTER
Betty is getting a reject on Dexcom sensors with reject stating that Medicare is going to be reaching out to the pharmacy for additional info. The reject also clarified that a PA was not to be sent to us. Left VM for pt explaining this and that he may try to contact Medicare himself. Also told pt that we do have samples in case he may need one in the mean time

## 2021-06-03 ENCOUNTER — TELEPHONE (OUTPATIENT)
Dept: ENDOCRINOLOGY | Facility: CLINIC | Age: 81
End: 2021-06-03

## 2021-07-09 ENCOUNTER — TELEPHONE (OUTPATIENT)
Dept: ENDOCRINOLOGY | Facility: CLINIC | Age: 81
End: 2021-07-09

## 2021-07-17 DIAGNOSIS — E10.49 TYPE 1 DIABETES MELLITUS WITH OTHER DIABETIC NEUROLOGICAL COMPLICATION (HCC): ICD-10-CM

## 2021-07-19 RX ORDER — PEN NEEDLE, DIABETIC 29 G X1/2"
NEEDLE, DISPOSABLE MISCELLANEOUS
Qty: 600 EACH | Refills: 2 | Status: SHIPPED | OUTPATIENT
Start: 2021-07-19 | End: 2022-10-19

## 2021-07-28 RX ORDER — INSULIN GLARGINE 100 [IU]/ML
INJECTION, SOLUTION SUBCUTANEOUS
Qty: 30 ML | Refills: 4 | Status: SHIPPED | OUTPATIENT
Start: 2021-07-28

## 2021-08-16 ENCOUNTER — OFFICE VISIT (OUTPATIENT)
Dept: ENDOCRINOLOGY | Facility: CLINIC | Age: 81
End: 2021-08-16

## 2021-08-16 VITALS
DIASTOLIC BLOOD PRESSURE: 60 MMHG | WEIGHT: 193 LBS | SYSTOLIC BLOOD PRESSURE: 110 MMHG | HEART RATE: 62 BPM | BODY MASS INDEX: 29.25 KG/M2 | HEIGHT: 68 IN

## 2021-08-16 DIAGNOSIS — E10.49 TYPE 1 DIABETES MELLITUS WITH OTHER DIABETIC NEUROLOGICAL COMPLICATION (HCC): Primary | ICD-10-CM

## 2021-08-16 DIAGNOSIS — E78.2 HYPERLIPIDEMIA, MIXED: ICD-10-CM

## 2021-08-16 DIAGNOSIS — E55.9 VITAMIN D DEFICIENCY: ICD-10-CM

## 2021-08-16 PROCEDURE — 99214 OFFICE O/P EST MOD 30 MIN: CPT | Performed by: INTERNAL MEDICINE

## 2021-08-16 RX ORDER — LIDOCAINE 50 MG/G
PATCH TOPICAL
COMMUNITY
Start: 2021-08-09

## 2021-08-16 RX ORDER — FINASTERIDE 1 MG/1
1 TABLET, FILM COATED ORAL DAILY
COMMUNITY
Start: 2021-07-31

## 2021-08-16 NOTE — PATIENT INSTRUCTIONS
Ok to drink boost as meal substitute.  Continue exercise.  Continue diabetes & chol meds & vit D supplements.  Call if blood sugars are running under 100 or over 200.  F/u in 6 months, with labs prior.

## 2021-08-16 NOTE — PROGRESS NOTES
[facility]        Patient Care Team:  Stefaine Blood APRN as PCP - General (Nurse Practitioner)    Chief Complaint:    Chief Complaint   Patient presents with   • Diabetes     Type 1,  2hr PP, Insulin 12 units (48 units carbs)  @ 8:30am         Subjective   Here for diabetes f/u  Blood sugars: in the high 100's  Checks blood sugars 4x/d for the last 3 months. Adjusts insulin dose per sliding scale based on results  Injects insulin 4x/d for the last 6 months  Exercise program: up & down steps  Taking vit D    Interval History:     Patient Complaints: decreased appetite. Eating to avoid low bl sugar  Patient Denies:  hypoglycemia  History taken from: patient    Review of Systems:   Review of Systems   Constitutional: Positive for appetite change.   HENT: Positive for trouble swallowing.    Eyes: Negative for blurred vision.   Gastrointestinal: Negative for nausea.   Endocrine: Negative for polyuria.   Musculoskeletal: Positive for gait problem.   Neurological: Negative for headache.     Lost  6 lb since last visit    Objective     Vital Signs  Heart Rate:  [62] 62  BP: (110)/(60) 110/60    Physical Exam:     General Appearance:    Alert, cooperative, in no acute distress   Head:    Normocephalic, without obvious abnormality, atraumatic   Eyes:            Lids and lashes normal, conjunctivae and sclerae normal, no   icterus, no pallor, corneas clear, PERRLA   Throat:   No oral lesions,  oral mucosa moist   Neck:   No adenopathy, supple,  no thyromegaly, no   carotid bruit   Lungs:     Clear     Heart:    Regular rhythm and normal rate   Chest Wall:    No abnormalities observed   Abdomen:     Normal bowel sounds, soft                 Extremities:   Charcot feet, hammer toes, no edema               Pulses:   Pulses palpable and equal bilaterally   Skin:   Dry. Stasis dermatitis   Neurologic:  DTR absent in ankles, absent vibratory sense in toes, unable to feel the 10g monofilament ( same as  1y ago )            Results Review:    I have reviewed the patient's new clinical results, labs & imaging.    Medication Review:   Prior to Admission medications    Medication Sig Start Date End Date Taking? Authorizing Provider   Accu-Chek FastClix Lancets misc Test BS three times daily / DX E 10.49 5/18/21  Yes Diane Garcia MD   acetone, urine, test (Ketostix) strip Pt to check his urine for ketones if his blood sugar is >250 or when ill 11/17/20  Yes Diane Garcia MD   albuterol (PROVENTIL) (2.5 MG/3ML) 0.083% nebulizer solution USE 1 VIAL IN NEBULIZER EVERY 4 HOURS 3/19/21  Yes Noni Torrez MD   ascorbic acid (VITAMIN C) 1000 MG tablet Take 1,000 mg by mouth Daily. 11/18/15  Yes Noni Torrez MD   Aspirin Buf,CaCarb-MgCarb-MgO, 81 MG tablet Take 81 mg by mouth Daily.   Yes Noni Torrez MD   atorvastatin (LIPITOR) 20 MG tablet Take 20 mg by mouth Daily. 4/7/21  Yes Noni Torrez MD   budesonide (PULMICORT) 0.5 MG/2ML nebulizer solution Take 0.5 mg by nebulization 2 (Two) Times a Day.   Yes Noni Torrez MD   Continuous Blood Gluc  (DEXCOM G6 ) device 1 each Continuous. Pt to use to monitor his blood sugars. 1/20/20  Yes Diane Garcia MD   Continuous Blood Gluc Sensor (Dexcom G6 Sensor) USE EVERY 10 DAYS AS DIRECTED 5/17/21  Yes Diane Garcia MD   Continuous Blood Gluc Transmit (Dexcom G6 Transmitter) misc USE AS DIRECTED 12/14/20  Yes Diane Garcia MD   diclofenac (VOLTAREN) 1 % gel gel APPLY 4 GRAMS TOPICALLY 4 TIMES DAILY MAX OF 16 GRAMS PER JOINT PER DAY OR 32 GRAMS TOTAL PER DAY 10/23/20  Yes Noni Torrez MD   Diclofenac Sodium (VOLTAREN) 1 % gel gel APPLY 4 GRAMS FOUR TIMES DAILY (MAX OF 16 GRAMS PER JOINT PER DAY OR 32 GRAMS DAILY) 4/29/21  Yes Noni Torrez MD   esomeprazole (nexIUM) 40 MG capsule Take 40 mg by mouth 2 (Two) Times a Day.   Yes Noni Torrez MD   finasteride (PROPECIA) 1 MG tablet  Take 1 mg by mouth Daily. 7/31/21  Yes Noni Torrez MD   formoterol (PERFOROMIST) 20 MCG/2ML nebulizer solution Take  by nebulization 2 (Two) Times a Day.   Yes Noni Torrez MD   furosemide (LASIX) 40 MG tablet Take 40 mg by mouth Daily.   Yes Noni Torrez MD   glucagon (GLUCAGEN HYPOKIT) 1 MG injection GLUCAGEN HYPOKIT 1 MG SOLR 9/20/18  Yes Noni Torerz MD   glucose blood test strip To test blood sugars 3x/d 1/18/21  Yes Diane Garcia MD   HumaLOG KwikPen 100 UNIT/ML solution pen-injector INJECT PER SLIDING SCALE MAXIMIUM DAILY DOSE OF 55 UNITS DAILY 2/2/21  Yes Diane Garcia MD   hydrALAZINE (APRESOLINE) 25 MG tablet TAKE 1 TABLET BY MOUTH ONCE DAILY WITH LUNCH 4/3/21  Yes Noni Torrez MD   hydrALAZINE (APRESOLINE) 50 MG tablet Take 50 mg by mouth 3 (Three) Times a Day.   Yes Noni Torrez MD   Insulin Pen Needle (BD ULTRA-FINE PEN NEEDLES) 29G X 12.7MM misc USE 1 PEN NEEDLE EACH SIX TIMES DAILY 7/19/21  Yes Diane Garcia MD   ipratropium-albuterol (DUO-NEB) 0.5-2.5 mg/3 ml nebulizer Inhale 1 ampule. 3-4 times daily   Yes Noni Torrez MD   isosorbide mononitrate (IMDUR) 60 MG 24 hr tablet Take 60 mg by mouth Daily. 4/7/21  Yes Noni Torrez MD   Lantus SoloStar 100 UNIT/ML injection pen Inject UP TO 40 units at bedtime dx:e10.65 7/28/21  Yes Diane Garcia MD   lidocaine (LIDODERM) 5 % USE 1 TO 3 PATCHES EXTERNALLY ONCE DAILY FOR MAX OF 12 HOURS DAILY. 8/9/21  Yes Noni Torrez MD   losartan (COZAAR) 100 MG tablet Take 100 mg by mouth Daily. 3/9/20  Yes Noni Torrez MD   memantine (NAMENDA) 5 MG tablet  3/22/21  Yes Noni Torrez MD   metoprolol tartrate (LOPRESSOR) 25 MG tablet Take 25 mg by mouth 2 (Two) Times a Day.   Yes Noni Torrez MD   mupirocin (BACTROBAN) 2 % ointment APPLY OINTMENT TOPICALLY TO AFFECTED AREA TWICE DAILY 6/15/21  Yes Provider, MD Noni   nitroglycerin  (NITROLINGUAL) 0.4 MG/SPRAY spray Place 1 spray under the tongue Every 5 (Five) Minutes As Needed for Chest Pain (1 spray every 5 min as needed).   Yes Noni Torrez MD   nystatin (MYCOSTATIN) 927406 UNIT/ML suspension TAKE 2 ML BY MOUTH 4 TIMES DAILY . SWISH FOR 30 SECONDS THEN SWALLOW. DO NOT EAT OR DRINK FOR 30 MINUTES AFTERWARDS 3/19/21  Yes Noni Torrez MD   pantoprazole (PROTONIX) 40 MG EC tablet Take 1 tablet by mouth twice daily 7/21/20  Yes Noni Torrez MD   potassium chloride (K-DUR) 10 MEQ CR tablet Daily. 11/18/15  Yes Noni Torrez MD   Spiriva Respimat 2.5 MCG/ACT aerosol solution inhaler INHALE 2 PUFFS BY MOUTH ONCE DAILY . EACH PUFF IS SEPARATE. HOLD FOR 10 SECONDS IF POSSIBLE 3/19/21  Yes Noni Torrez MD   tamsulosin (FLOMAX) 0.4 MG capsule 24 hr capsule Take 1 capsule by mouth Daily.   Yes Noni Torrez MD   vitamin D (ERGOCALCIFEROL) 1.25 MG (20294 UT) capsule capsule Take one capsule Q 14 days. 4/6/21  Yes Diane Garcia MD   Yupelri 175 MCG/3ML nebulizer solution TAKE 3 MLS BY NEBULIZATION DAILY. 4/22/21  Yes Noni Torrez MD   Zofran ODT 4 MG disintegrating tablet Pt to place one tab under tongue every 12 hours as needed for nausea. Dx: E10.65 11/20/20  Yes Diane Garcia MD   atorvastatin (LIPITOR) 40 MG tablet Take 40 mg by mouth Daily.  8/16/21  Noni Torrez MD       Lab Results (most recent)     None        Lab Results   Component Value Date    HGBA1C 8.4 (H) 08/09/2021    HGBA1C 7.9 (H) 01/04/2021    HGBA1C 8.2 (H) 07/21/2020      Lab Results   Component Value Date    GLUCOSE 286 (H) 07/09/2019    BUN 28 (H) 08/09/2021    CREATININE 0.8 08/09/2021    EGFRIFNONA 72 07/09/2019    BCR 37.0 (H) 08/09/2021    K 4.5 08/09/2021    CO2 30 08/09/2021    CALCIUM 9.2 08/09/2021    ALBUMIN 4.0 08/09/2021    LABIL2 1.2 08/09/2021    AST 29 08/09/2021    ALT 29 08/09/2021    CHOL 116 07/09/2019    LDL 95 07/21/2020    HDL 55  07/21/2020    TRIG 109 07/21/2020     Lab Results   Component Value Date    TSH 1.490 08/09/2021    CSAI75JM 47 08/09/2021     Microalb/cr ratio 212; Hkxi446, Trigl 71, LDL 57, HDL 57 on 8/9/2021.    Assessment/Plan     Diagnoses and all orders for this visit:    1. 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 Vitamin D deficiency    3. Hyperlipidemia, mixed  -     Comprehensive Metabolic Panel; Future    Glucose control worse. Lipids & vit D stable.    Ok to drink boost as meal substitute.  Continue exercise.  Continue diabetes & chol meds & vit D supplements.  Call if blood sugars are running under 100 or over 200.        Diane Garcia MD  08/16/21  19:50 EDT

## 2021-08-29 NOTE — TELEPHONE ENCOUNTER
BG's scanned into phone note.  Per MD s/o, instructed no changes to be made in insulin. Pt verbalized an understanding.  
0

## 2021-10-08 ENCOUNTER — TELEPHONE (OUTPATIENT)
Dept: ENDOCRINOLOGY | Facility: CLINIC | Age: 81
End: 2021-10-08

## 2021-10-08 NOTE — TELEPHONE ENCOUNTER
Pt was wanting to know what he could use to treat low blood sugars since his pharmacy is out of the glucose gel he likes to use. Reviewed hypoglycemcia treatment and mailed pt a hypolycemia handout to review per his request. He was in the process of treating a low blood sugar while on the phone. Right before educator called, he had eaten a protein bar with 22 g of carbs in it. 15 mins later his BG had gone from 84 to 82. Advised pt that it may be taking a little bit longer for his BG to go up because of the protein content in that bar. Called pt back 15 mins later and his BG was 120 and rising. Pt also requested a rx for Glucagon.

## 2021-11-16 ENCOUNTER — TELEPHONE (OUTPATIENT)
Dept: ENDOCRINOLOGY | Facility: CLINIC | Age: 81
End: 2021-11-16

## 2021-12-29 ENCOUNTER — TELEPHONE (OUTPATIENT)
Dept: ENDOCRINOLOGY | Facility: CLINIC | Age: 81
End: 2021-12-29

## 2021-12-29 DIAGNOSIS — E10.65 UNCONTROLLED TYPE 1 DIABETES MELLITUS WITH HYPERGLYCEMIA: ICD-10-CM

## 2021-12-30 RX ORDER — PROCHLORPERAZINE 25 MG/1
SUPPOSITORY RECTAL
Qty: 1 EACH | Refills: 3 | Status: SHIPPED | OUTPATIENT
Start: 2021-12-30 | End: 2022-10-12

## 2022-01-20 ENCOUNTER — TELEPHONE (OUTPATIENT)
Dept: ENDOCRINOLOGY | Facility: CLINIC | Age: 82
End: 2022-01-20

## 2022-02-04 RX ORDER — ERGOCALCIFEROL 1.25 MG/1
CAPSULE ORAL
Qty: 6 CAPSULE | Refills: 2 | Status: SHIPPED | OUTPATIENT
Start: 2022-02-04 | End: 2022-10-17

## 2022-02-17 RX ORDER — TRAMADOL HYDROCHLORIDE 50 MG/1
TABLET ORAL
COMMUNITY
Start: 2021-11-30

## 2022-02-17 RX ORDER — NITROGLYCERIN 0.4 MG/1
TABLET SUBLINGUAL
COMMUNITY
Start: 2021-12-09

## 2022-02-23 ENCOUNTER — OFFICE VISIT (OUTPATIENT)
Dept: ENDOCRINOLOGY | Facility: CLINIC | Age: 82
End: 2022-02-23

## 2022-02-23 VITALS
SYSTOLIC BLOOD PRESSURE: 120 MMHG | DIASTOLIC BLOOD PRESSURE: 58 MMHG | HEIGHT: 68 IN | HEART RATE: 52 BPM | WEIGHT: 192 LBS | BODY MASS INDEX: 29.1 KG/M2 | TEMPERATURE: 96.9 F

## 2022-02-23 DIAGNOSIS — E10.42 TYPE 1 DIABETES MELLITUS WITH DIABETIC POLYNEUROPATHY: Primary | ICD-10-CM

## 2022-02-23 DIAGNOSIS — E55.9 VITAMIN D DEFICIENCY: ICD-10-CM

## 2022-02-23 DIAGNOSIS — E78.2 HYPERLIPIDEMIA, MIXED: ICD-10-CM

## 2022-02-23 PROCEDURE — 99214 OFFICE O/P EST MOD 30 MIN: CPT | Performed by: INTERNAL MEDICINE

## 2022-02-23 RX ORDER — ESCITALOPRAM OXALATE 5 MG/1
5 TABLET ORAL DAILY
COMMUNITY
Start: 2022-02-16

## 2022-02-23 NOTE — PATIENT INSTRUCTIONS
Keep up the good work!  Continue exercise.  Continue diabetes & chol meds & vit D supplements.  Call if blood sugars are running under 100 or over 200.  F/u in 6 months, with labs prior.

## 2022-02-26 NOTE — PROGRESS NOTES
[facility]        Patient Care Team:  Stefanie Blood APRN as PCP - General (Nurse Practitioner)    Chief Complaint:    Chief Complaint   Patient presents with   • Diabetes     Type 1, , 2 hr PP, Humalog 19 units this am         Subjective   Here for diabetes f/u  Blood sugars: in the high 100's. Using the DexCom CGMS  Checks blood sugars 4x/d for the last 3 months. Adjusts insulin dose per sliding scale based on results  Inject insulin 4x/d for the last 6 months  Exercise program: up & down the steps  Taking vit D    Interval History:     Patient Complaints: decreased appetite  Patient Denies:  hypoglycemia  History taken from: patient    Review of Systems:   Review of Systems   Constitutional: Positive for appetite change.   Eyes: Negative for blurred vision.   Gastrointestinal: Negative for nausea.   Endocrine: Negative for polyuria.   Musculoskeletal: Positive for gait problem.   Neurological: Positive for weakness. Negative for headache.     Lost  1 lb since last visit    Objective     Vital Signs      Vitals:    02/23/22 1109   BP: 120/58   Pulse: 52   Temp: 96.9 °F (36.1 °C)         Physical Exam:     General Appearance:    Alert, cooperative, in no acute distress   Head:    Normocephalic, without obvious abnormality, atraumatic   Eyes:            Lids and lashes normal, conjunctivae and sclerae normal, no   icterus, no pallor, corneas clear, PERRLA   Throat:   No oral lesions,  oral mucosa moist   Neck:   No adenopathy, supple,  no thyromegaly, no   carotid bruit   Lungs:     Clear     Heart:    Regular rhythm and normal rate   Chest Wall:    No abnormalities observed   Abdomen:     Normal bowel sounds, soft                 Extremities:   Atrophy of hand muscles, hammer toes, Charcot feet               Pulses:   Pulses palpable and equal bilaterally   Skin:   Dry. Stasis dermatitis   Neurologic:  DTR absent, unable to feel the 10g monofilament          Results Review:    I have  reviewed the patient's new clinical results, labs & imaging.    Medication Review:   Prior to Admission medications    Medication Sig Start Date End Date Taking? Authorizing Provider   Accu-Chek FastClix Lancets misc Test BS three times daily / DX E 10.49 5/18/21  Yes Diane Garcia MD   acetone, urine, test (Ketostix) strip Pt to check his urine for ketones if his blood sugar is >250 or when ill 11/17/20  Yes Diane Garcia MD   albuterol (PROVENTIL) (2.5 MG/3ML) 0.083% nebulizer solution USE 1 VIAL IN NEBULIZER EVERY 4 HOURS 3/19/21  Yes Noni Torrez MD   ascorbic acid (VITAMIN C) 1000 MG tablet Take 1,000 mg by mouth Daily. 11/18/15  Yes Noni Torrez MD   Aspirin Buf,CaCarb-MgCarb-MgO, 81 MG tablet Take 81 mg by mouth Daily.   Yes Noni Torrez MD   atorvastatin (LIPITOR) 20 MG tablet Take 20 mg by mouth Daily. 4/7/21  Yes Noni Torrez MD   budesonide (PULMICORT) 0.5 MG/2ML nebulizer solution Take 0.5 mg by nebulization 2 (Two) Times a Day.   Yes Noni Torrez MD   Continuous Blood Gluc  (DEXCOM G6 ) device 1 each Continuous. Pt to use to monitor his blood sugars. 1/20/20  Yes Diane Garcia MD   Continuous Blood Gluc Sensor (Dexcom G6 Sensor) USE EVERY 10 DAYS AS DIRECTED 5/17/21  Yes Diane Garcia MD   Continuous Blood Gluc Transmit (Dexcom G6 Transmitter) misc USE AS DIRECTED 12/30/21  Yes Diane Garcia MD   diclofenac (VOLTAREN) 1 % gel gel APPLY 4 GRAMS TOPICALLY 4 TIMES DAILY MAX OF 16 GRAMS PER JOINT PER DAY OR 32 GRAMS TOTAL PER DAY 10/23/20  Yes Noni Torrez MD   Diclofenac Sodium (VOLTAREN) 1 % gel gel APPLY 4 GRAMS FOUR TIMES DAILY (MAX OF 16 GRAMS PER JOINT PER DAY OR 32 GRAMS DAILY) 4/29/21  Yes Noni Torrez MD   escitalopram (LEXAPRO) 5 MG tablet Take 5 mg by mouth Daily. 2/16/22  Yes Noni Torrez MD   esomeprazole (nexIUM) 40 MG capsule Take 40 mg by mouth 2 (Two) Times a Day.   Yes Shan,  MD Noni   finasteride (PROPECIA) 1 MG tablet Take 1 mg by mouth Daily. 7/31/21  Yes Noni Torrez MD   formoterol (PERFOROMIST) 20 MCG/2ML nebulizer solution Take  by nebulization 2 (Two) Times a Day.   Yes Noni Torrez MD   furosemide (LASIX) 40 MG tablet Take 40 mg by mouth Daily.   Yes Noni Torrez MD   Glucagon 3 MG/DOSE powder 1 each into the nostril(s) as directed by provider As Needed (prn for low blood sugar treatment). 10/8/21  Yes Diane Garcia MD   glucose blood test strip To test blood sugars 3x/d 1/18/21  Yes Diane Garcia MD   HumaLOG KwikPen 100 UNIT/ML solution pen-injector INJECT PER SLIDING SCALE MAXIMIUM DAILY DOSE OF 55 UNITS DAILY 2/2/21  Yes Diane Garcia MD   hydrALAZINE (APRESOLINE) 25 MG tablet TAKE 1 TABLET BY MOUTH ONCE DAILY WITH LUNCH 4/3/21  Yes Noni Torrez MD   hydrALAZINE (APRESOLINE) 50 MG tablet Take 50 mg by mouth 3 (Three) Times a Day.   Yes Noni Torrez MD   Insulin Pen Needle (BD ULTRA-FINE PEN NEEDLES) 29G X 12.7MM misc USE 1 PEN NEEDLE EACH SIX TIMES DAILY 7/19/21  Yes Diane Garcia MD   ipratropium-albuterol (DUO-NEB) 0.5-2.5 mg/3 ml nebulizer Inhale 1 ampule. 3-4 times daily   Yes Noni Torrez MD   isosorbide mononitrate (IMDUR) 60 MG 24 hr tablet Take 60 mg by mouth Daily. 4/7/21  Yes Noni Torrez MD   Lantus SoloStar 100 UNIT/ML injection pen Inject UP TO 40 units at bedtime dx:e10.65 7/28/21  Yes Diane Garcia MD   lidocaine (LIDODERM) 5 % USE 1 TO 3 PATCHES EXTERNALLY ONCE DAILY FOR MAX OF 12 HOURS DAILY. 8/9/21  Yes Noni Torrez MD   losartan (COZAAR) 100 MG tablet Take 100 mg by mouth Daily. 3/9/20  Yes Noni Torrez MD   memantine (NAMENDA) 5 MG tablet  3/22/21  Yes Noni Torrez MD   metoprolol tartrate (LOPRESSOR) 25 MG tablet Take 25 mg by mouth 2 (Two) Times a Day.   Yes Provider, MD Noni   mupirocin (BACTROBAN) 2 % ointment APPLY OINTMENT  TOPICALLY TO AFFECTED AREA TWICE DAILY 6/15/21  Yes Noni Torrez MD   nitroglycerin (NITROSTAT) 0.4 MG SL tablet PLACE 1 TABLET BY MOUTH UNDER THE TONGUE EVERY 5 MINUTES AS NEEDED FOR CHEST PAIN 12/9/21  Yes Noni Torrez MD   nystatin (MYCOSTATIN) 787990 UNIT/ML suspension TAKE 2 ML BY MOUTH 4 TIMES DAILY . SWISH FOR 30 SECONDS THEN SWALLOW. DO NOT EAT OR DRINK FOR 30 MINUTES AFTERWARDS 3/19/21  Yes Noni Torrez MD   pantoprazole (PROTONIX) 40 MG EC tablet Take 1 tablet by mouth twice daily 7/21/20  Yes Noni Torrez MD   potassium chloride (K-DUR) 10 MEQ CR tablet Daily. 11/18/15  Yes Noni Torrez MD   Spiriva Respimat 2.5 MCG/ACT aerosol solution inhaler INHALE 2 PUFFS BY MOUTH ONCE DAILY . EACH PUFF IS SEPARATE. HOLD FOR 10 SECONDS IF POSSIBLE 3/19/21  Yes Noni Torrez MD   tamsulosin (FLOMAX) 0.4 MG capsule 24 hr capsule Take 1 capsule by mouth Daily.   Yes Noni Torrez MD   traMADol (ULTRAM) 50 MG tablet TAKE 1 TABLET BY MOUTH IN THE AFTERNOON AND 2 IN THE EVENING . DO NOT EXCEED 3 PER 24 HOURS 11/30/21  Yes Noni Torrez MD   vitamin D (ERGOCALCIFEROL) 1.25 MG (48568 UT) capsule capsule TAKE 1 CAPSULE BY MOUTH EVERY 14 DAYS 2/4/22  Yes Diane Garcia MD   Yupelri 175 MCG/3ML nebulizer solution TAKE 3 MLS BY NEBULIZATION DAILY. 4/22/21  Yes Noni Torrez MD   Zofran ODT 4 MG disintegrating tablet Pt to place one tab under tongue every 12 hours as needed for nausea. Dx: E10.65 11/20/20  Yes Diane Garcia MD       Lab Results (most recent)     None        Lab Results   Component Value Date    HGBA1C 8.2 (H) 02/09/2022    HGBA1C 8.4 (H) 08/09/2021    HGBA1C 7.9 (H) 01/04/2021      Lab Results   Component Value Date    GLUCOSE 286 (H) 07/09/2019    BUN 36 (H) 02/09/2022    CREATININE 0.81 02/09/2022    CREATININE 24.1 02/09/2022    EGFRIFNONA 72 07/09/2019    BCR 44.8 (H) 02/09/2022    K 4.4 02/09/2022    CO2 27 02/09/2022     CALCIUM 8.8 02/09/2022    ALBUMIN 3.8 02/09/2022    LABIL2 1.3 02/09/2022    AST 34 02/09/2022    ALT 28 02/09/2022    CHOL 116 07/09/2019    LDL 95 07/21/2020    HDL 55 07/21/2020    TRIG 109 07/21/2020     Lab Results   Component Value Date    TSH 1.410 02/09/2022    TMTC60TP 61.9 02/09/2022     Microalb/cr ratio 159    Assessment/Plan     Diagnoses and all orders for this visit:    1. 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 Vitamin D deficiency    3. Hyperlipidemia, mixed  -     Comprehensive Metabolic Panel; Future    Glucose control improved. Lipids & vit D stable.    Continue exercise.  Continue diabetes & chol meds & vit D supplements.  Call if blood sugars are running under 100 or over 200.        Diane Garcia MD  02/25/22  22:49 EST

## 2022-02-28 ENCOUNTER — TELEPHONE (OUTPATIENT)
Dept: ENDOCRINOLOGY | Facility: CLINIC | Age: 82
End: 2022-02-28

## 2022-03-03 ENCOUNTER — TELEPHONE (OUTPATIENT)
Dept: ENDOCRINOLOGY | Facility: CLINIC | Age: 82
End: 2022-03-03

## 2022-03-21 RX ORDER — INSULIN LISPRO 100 [IU]/ML
INJECTION, SOLUTION INTRAVENOUS; SUBCUTANEOUS
Qty: 60 ML | Refills: 4 | Status: SHIPPED | OUTPATIENT
Start: 2022-03-21

## 2022-05-02 ENCOUNTER — TELEPHONE (OUTPATIENT)
Dept: ENDOCRINOLOGY | Facility: CLINIC | Age: 82
End: 2022-05-02

## 2022-05-06 NOTE — TELEPHONE ENCOUNTER
See VM scanned into phone note. Cannot find PA form for Medicare in CoverMyMeds even after speaking with pharmacy staff. Attempted to call [facility] but had to LVM to call back [facility].     Has pt ever tried to get supplies from another company or DME? [facility] may be able to cover it both under medicare and [employer].

## 2022-05-09 NOTE — TELEPHONE ENCOUNTER
Called the [facility] specialty pharmacy and they said pt does not need a PA for his sensors.  They only need updated OV that they already have.  Called and LVM with pt with the above info and their number if they need to call them.

## 2022-06-13 DIAGNOSIS — E10.49 TYPE 1 DIABETES MELLITUS WITH OTHER DIABETIC NEUROLOGICAL COMPLICATION: ICD-10-CM

## 2022-06-13 RX ORDER — PROCHLORPERAZINE 25 MG/1
SUPPOSITORY RECTAL
Qty: 3 EACH | Refills: 11 | Status: SHIPPED | OUTPATIENT
Start: 2022-06-13 | End: 2023-03-10 | Stop reason: SDUPTHER

## 2022-07-25 ENCOUNTER — TELEPHONE (OUTPATIENT)
Dept: ENDOCRINOLOGY | Facility: CLINIC | Age: 82
End: 2022-07-25

## 2022-07-25 NOTE — TELEPHONE ENCOUNTER
Pt has been having issues with his dexcom sensor readings having extreme fluctuations and his sensor losing signal. Discussed placing the sensor on the back of his arm instead of his stomach to see if that still causes problems. He may turn off his sensor loss signal alarm. He will call if he continues to have issues. He may need to be switched to the [place].

## 2022-08-10 ENCOUNTER — TELEPHONE (OUTPATIENT)
Dept: ENDOCRINOLOGY | Facility: CLINIC | Age: 82
End: 2022-08-10

## 2022-09-08 RX ORDER — MELOXICAM 7.5 MG/1
TABLET ORAL
COMMUNITY
Start: 2022-07-14

## 2022-09-08 RX ORDER — MEMANTINE HYDROCHLORIDE 10 MG/1
10 TABLET ORAL 2 TIMES DAILY
COMMUNITY
Start: 2022-06-06

## 2022-09-12 ENCOUNTER — OFFICE VISIT (OUTPATIENT)
Dept: ENDOCRINOLOGY | Facility: CLINIC | Age: 82
End: 2022-09-12

## 2022-09-12 VITALS
SYSTOLIC BLOOD PRESSURE: 130 MMHG | HEIGHT: 67 IN | HEART RATE: 51 BPM | BODY MASS INDEX: 30.61 KG/M2 | WEIGHT: 195 LBS | DIASTOLIC BLOOD PRESSURE: 60 MMHG

## 2022-09-12 DIAGNOSIS — E10.42 TYPE 1 DIABETES MELLITUS WITH DIABETIC POLYNEUROPATHY: Primary | ICD-10-CM

## 2022-09-12 DIAGNOSIS — E55.9 VITAMIN D DEFICIENCY: ICD-10-CM

## 2022-09-12 DIAGNOSIS — E10.65 TYPE 1 DIABETES MELLITUS WITH HYPERGLYCEMIA: ICD-10-CM

## 2022-09-12 DIAGNOSIS — E78.5 HYPERLIPIDEMIA, UNSPECIFIED HYPERLIPIDEMIA TYPE: ICD-10-CM

## 2022-09-12 PROCEDURE — 99214 OFFICE O/P EST MOD 30 MIN: CPT | Performed by: INTERNAL MEDICINE

## 2022-09-12 RX ORDER — ATORVASTATIN CALCIUM 20 MG/1
1 TABLET, FILM COATED ORAL DAILY
COMMUNITY
Start: 2022-09-06

## 2022-09-12 RX ORDER — LOSARTAN POTASSIUM 100 MG/1
1 TABLET ORAL DAILY
COMMUNITY
Start: 2022-09-06

## 2022-09-12 RX ORDER — FINASTERIDE 1 MG/1
1 TABLET, FILM COATED ORAL DAILY
COMMUNITY
Start: 2022-07-29

## 2022-09-12 RX ORDER — DIPHENOXYLATE HYDROCHLORIDE AND ATROPINE SULFATE 2.5; .025 MG/1; MG/1
1 TABLET ORAL DAILY
COMMUNITY

## 2022-09-12 RX ORDER — MEMANTINE HYDROCHLORIDE 5 MG/1
5 TABLET ORAL
COMMUNITY
Start: 2022-04-28

## 2022-09-12 RX ORDER — PANTOPRAZOLE SODIUM 40 MG/1
40 TABLET, DELAYED RELEASE ORAL
COMMUNITY
Start: 2022-04-01

## 2022-09-12 NOTE — PATIENT INSTRUCTIONS
Continue exercise as able.  Continue diabetes & chol meds.  Take Lantus @ supper.  Eat a bedtime snack ( peanut butter).  Call if blood sugars are running under 100 or over 200.  F/u in 6 months, with labs prior.

## 2022-09-13 ENCOUNTER — TELEPHONE (OUTPATIENT)
Dept: ENDOCRINOLOGY | Facility: CLINIC | Age: 82
End: 2022-09-13

## 2022-09-13 NOTE — PROGRESS NOTES
Spent < 30 mins with pt, therefore will not be putting in a charge for today's visit. Stats form scanned into visit. Assisted pt in setting up Dexcom Clarity sharing.

## 2022-09-13 NOTE — PROGRESS NOTES
[facility]        Patient Care Team:  Stefanie Blood APRN as PCP - General (Nurse Practitioner)    Chief Complaint:    Chief Complaint   Patient presents with   • Diabetes     Type 1, , 3 hr PP, Humalog 10 units this am, Lantus 27 units last night         Subjective   Here for diabetes f/u  Blood sugars: in the high 100's, Using the DexCom CGMS  Pt has agreed to wear the CGM device and share readings on a regular basis with our office.  Exercise program: using walker  Taking vit D    Interval History:     Patient Complaints: memory issues & dexterity getting worse  Patient Denies: severe hypoglycemia  History taken from: patient & wife    Review of Systems:   Review of Systems   HENT: Negative for trouble swallowing.    Eyes: Negative for blurred vision.   Gastrointestinal: Negative for nausea.   Endocrine: Negative for polyuria.   Musculoskeletal: Positive for gait problem.   Neurological: Positive for weakness. Negative for headache.     Gained 3 lb since last visit    Objective     Vital Signs  Heart Rate:  [51] 51  BP: (130)/(60) 130/60    Physical Exam:     General Appearance:    Alert, cooperative, in no acute distress   Head:    Normocephalic, without obvious abnormality, atraumatic   Eyes:            Lids and lashes normal, conjunctivae and sclerae normal, no   icterus, no pallor, corneas clear, PERRLA   Throat:   No oral lesions,  oral mucosa moist   Neck:   No adenopathy, supple,  no thyromegaly, no   carotid bruit   Lungs:     Clear    Heart:    Regular rhythm and normal rate   Chest Wall:    No abnormalities observed   Abdomen:     Normal bowel sounds, soft                 Extremities:   Charcot feet, L ankle swelling               Pulses:   Pulses palpable and equal bilaterally   Skin:   Dry. Ecchymosis   Neurologic:  DTR absent in ankles, absent vibratory sense in toess, unable to feel the 10g monofilament; hand muscles atrophy  ( same as 1y ago )'          Results  Review:    I have reviewed the patient's new clinical results, labs & imaging.    Medication Review:   Prior to Admission medications    Medication Sig Start Date End Date Taking? Authorizing Provider   Accu-Chek FastClix Lancets misc Test BS three times daily / DX E 10.49 5/18/21  Yes Diane Garcia MD   acetone, urine, test (Ketostix) strip Pt to check his urine for ketones if his blood sugar is >250 or when ill 11/17/20  Yes Diane Garcia MD   albuterol (PROVENTIL) (2.5 MG/3ML) 0.083% nebulizer solution USE 1 VIAL IN NEBULIZER EVERY 4 HOURS 3/19/21  Yes Noni Torrez MD   ascorbic acid (VITAMIN C) 1000 MG tablet Take 1,000 mg by mouth Daily. 11/18/15  Yes Noni Torrez MD   Aspirin Buf,CaCarb-MgCarb-MgO, 81 MG tablet Take 81 mg by mouth Daily.   Yes Noni Torrez MD   atorvastatin (LIPITOR) 20 MG tablet Take 20 mg by mouth Daily. 4/7/21  Yes Noni Torrez MD   atorvastatin (LIPITOR) 20 MG tablet Take 1 tablet by mouth Daily. 9/6/22  Yes Noni Torrez MD   budesonide (PULMICORT) 0.5 MG/2ML nebulizer solution Take 0.5 mg by nebulization 2 (Two) Times a Day.   Yes Noni Torrez MD   Continuous Blood Gluc  (DEXCOM G6 ) device 1 each Continuous. Pt to use to monitor his blood sugars. 1/20/20  Yes Diane Garcia MD   Continuous Blood Gluc Sensor (Dexcom G6 Sensor) USE EVERY 10 DAYS AS DIRECTED 6/13/22  Yes Diane Garcia MD   Continuous Blood Gluc Transmit (Dexcom G6 Transmitter) misc USE AS DIRECTED 12/30/21  Yes Diane Garcia MD   diclofenac (VOLTAREN) 1 % gel gel APPLY 4 GRAMS TOPICALLY 4 TIMES DAILY MAX OF 16 GRAMS PER JOINT PER DAY OR 32 GRAMS TOTAL PER DAY 10/23/20  Yes Noni Torrez MD   Diclofenac Sodium (VOLTAREN) 1 % gel gel APPLY 4 GRAMS FOUR TIMES DAILY (MAX OF 16 GRAMS PER JOINT PER DAY OR 32 GRAMS DAILY) 4/29/21  Yes Noni Torrez MD   escitalopram (LEXAPRO) 5 MG tablet Take 5 mg by mouth Daily. 2/16/22   Yes Noni Torrez MD   esomeprazole (nexIUM) 40 MG capsule Take 40 mg by mouth 2 (Two) Times a Day.   Yes Noni Torrez MD   finasteride (PROPECIA) 1 MG tablet Take 1 mg by mouth Daily. 7/31/21  Yes Noni Torrez MD   finasteride (PROPECIA) 1 MG tablet Take 1 tablet by mouth Daily. 7/29/22  Yes Noni Torrez MD   formoterol (PERFOROMIST) 20 MCG/2ML nebulizer solution Take  by nebulization 2 (Two) Times a Day.   Yes Noni Torrez MD   furosemide (LASIX) 40 MG tablet Take 40 mg by mouth Daily.   Yes Noni Torrez MD   Glucagon 3 MG/DOSE powder 1 each into the nostril(s) as directed by provider As Needed (prn for low blood sugar treatment). 10/8/21  Yes Diane Garcia MD   glucose blood test strip To test blood sugars 3x/d 1/18/21  Yes Diane Garcia MD   HumaLOG KwikPen 100 UNIT/ML solution pen-injector INJECT PER SLIDING SCALE MAXIMUM DAILY DOSE OF 55 UNITS 3/21/22  Yes Diane Garcia MD   hydrALAZINE (APRESOLINE) 25 MG tablet TAKE 1 TABLET BY MOUTH ONCE DAILY WITH LUNCH 4/3/21  Yes Noni Torrez MD   hydrALAZINE (APRESOLINE) 50 MG tablet Take 50 mg by mouth 3 (Three) Times a Day.   Yes Noni Torrez MD   Insulin Pen Needle (BD ULTRA-FINE PEN NEEDLES) 29G X 12.7MM misc USE 1 PEN NEEDLE EACH SIX TIMES DAILY 7/19/21  Yes Diane Garcia MD   ipratropium-albuterol (DUO-NEB) 0.5-2.5 mg/3 ml nebulizer Inhale 1 ampule. 3-4 times daily   Yes Noni Torrez MD   isosorbide mononitrate (IMDUR) 60 MG 24 hr tablet Take 60 mg by mouth Daily. 4/7/21  Yes Noni Torrez MD Lantus SoloStar 100 UNIT/ML injection pen Inject UP TO 40 units at bedtime dx:e10.65  Patient taking differently: Inject 27 Units under the skin into the appropriate area as directed Every Night. Inject UP TO 40 units at bedtime dx:e10.65 7/28/21  Yes Diane Garcia MD   lidocaine (LIDODERM) 5 % USE 1 TO 3 PATCHES EXTERNALLY ONCE DAILY FOR MAX OF 12 HOURS DAILY.  8/9/21  Yes Noni Torrez MD   losartan (COZAAR) 100 MG tablet Take 100 mg by mouth Daily. 3/9/20  Yes Noni Torrez MD   losartan (COZAAR) 100 MG tablet Take 1 tablet by mouth Daily. 9/6/22  Yes Noni Torrez MD   meloxicam (MOBIC) 7.5 MG tablet TAKE 1 TABLET BY MOUTH NIGHTLY AS NEEDED FOR PAIN (TAKE 1 HOUR PRIOR TO BED) 7/14/22  Yes Noni Torrez MD   memantine (NAMENDA) 10 MG tablet Take 10 mg by mouth 2 (Two) Times a Day. 6/6/22  Yes Noni Torrez MD   memantine (NAMENDA) 5 MG tablet  3/22/21  Yes Noni Torrez MD   memantine (NAMENDA) 5 MG tablet Take 5 mg by mouth. 4/28/22  Yes Noni Torrez MD   metoprolol tartrate (LOPRESSOR) 25 MG tablet Take 25 mg by mouth 2 (Two) Times a Day.   Yes Noni Torrez MD   multivitamin (THERAGRAN) tablet tablet Take 1 tablet by mouth Daily.   Yes Noni Torrez MD   mupirocin (BACTROBAN) 2 % ointment APPLY OINTMENT TOPICALLY TO AFFECTED AREA TWICE DAILY 6/15/21  Yes Noni Torrez MD   mupirocin (BACTROBAN) 2 % ointment Apply 1 application topically to the appropriate area as directed 3 (Three) Times a Day. 8/31/22 9/30/22 Yes Noni Torrez MD   nitroglycerin (NITROSTAT) 0.4 MG SL tablet PLACE 1 TABLET BY MOUTH UNDER THE TONGUE EVERY 5 MINUTES AS NEEDED FOR CHEST PAIN 12/9/21  Yes Noni Torrez MD   nystatin (MYCOSTATIN) 232284 UNIT/ML suspension TAKE 2 ML BY MOUTH 4 TIMES DAILY . SWISH FOR 30 SECONDS THEN SWALLOW. DO NOT EAT OR DRINK FOR 30 MINUTES AFTERWARDS 3/19/21  Yes Noni Torrez MD   pantoprazole (PROTONIX) 40 MG EC tablet Take 1 tablet by mouth twice daily 7/21/20  Yes Noni Torrez MD   pantoprazole (PROTONIX) 40 MG EC tablet Take 40 mg by mouth. 4/1/22  Yes Noni Torrez MD   potassium chloride (K-DUR) 10 MEQ CR tablet Daily. 11/18/15  Yes Noni Torrez MD   Spiriva Respimat 2.5 MCG/ACT aerosol solution inhaler INHALE 2 PUFFS BY MOUTH ONCE  DAILY . EACH PUFF IS SEPARATE. HOLD FOR 10 SECONDS IF POSSIBLE 3/19/21  Yes Noni Torrez MD   tamsulosin (FLOMAX) 0.4 MG capsule 24 hr capsule Take 1 capsule by mouth Daily.   Yes Noni Torrez MD   traMADol (ULTRAM) 50 MG tablet TAKE 1 TABLET BY MOUTH IN THE AFTERNOON AND 2 IN THE EVENING . DO NOT EXCEED 3 PER 24 HOURS 11/30/21  Yes Noni Torrez MD   vitamin D (ERGOCALCIFEROL) 1.25 MG (97791 UT) capsule capsule TAKE 1 CAPSULE BY MOUTH EVERY 14 DAYS 2/4/22  Yes Diane Garcia MD   Yupelri 175 MCG/3ML nebulizer solution TAKE 3 MLS BY NEBULIZATION DAILY. 4/22/21  Yes Noni Torrez MD   Zofran ODT 4 MG disintegrating tablet Pt to place one tab under tongue every 12 hours as needed for nausea. Dx: E10.65 11/20/20  Yes Diane Garcia MD       Lab Results (most recent)     None        Lab Results   Component Value Date    HGBA1C 8.9 (H) 08/24/2022    HGBA1C 8.2 (H) 02/09/2022    HGBA1C 8.4 (H) 08/09/2021      Lab Results   Component Value Date    GLUCOSE 286 (H) 07/09/2019    BUN 36 (H) 02/09/2022    CREATININE 0.81 02/09/2022    CREATININE 24.1 02/09/2022    EGFRIFNONA 72 07/09/2019    BCR 44.8 (H) 02/09/2022    K 4.4 02/09/2022    CO2 27 02/09/2022    CALCIUM 8.8 02/09/2022    ALBUMIN 3.8 02/09/2022    LABIL2 1.3 02/09/2022    AST 34 02/09/2022    ALT 28 02/09/2022    CHOL 116 07/09/2019    LDL 95 07/21/2020    HDL 55 07/21/2020    TRIG 109 07/21/2020     Lab Results   Component Value Date    TSH 1.410 02/09/2022    FREET4 1.24 08/24/2022    PWRJ64OF 63.4 08/24/2022     Microalb/cr ratio 145    Assessment & Plan     Diagnoses and all orders for this visit:    1. Type 1 diabetes mellitus with diabetic polyneuropathy (HCC) (Primary)    2. Hyperlipidemia, unspecified hyperlipidemia type    3. Vitamin D deficiency    Glucose control worse. Lipids, thyroid & vit D stable.    Continue exercise as able.  Continue diabetes & chol meds.  Take Lantus @ supper.  Eat a bedtime snack (  "peanut butter).  Call if blood sugars are running under 100 or over 200.  Appt with DM educator re \"smart pens\"(record time & insulin dose)        Diane Garcia MD  09/12/22  21:47 EDT        "

## 2022-09-15 ENCOUNTER — OFFICE VISIT (OUTPATIENT)
Dept: ENDOCRINOLOGY | Facility: CLINIC | Age: 82
End: 2022-09-15

## 2022-09-15 DIAGNOSIS — E10.65 TYPE 1 DIABETES MELLITUS WITH HYPERGLYCEMIA: ICD-10-CM

## 2022-09-15 PROCEDURE — G0108 DIAB MANAGE TRN  PER INDIV: HCPCS | Performed by: DIETITIAN, REGISTERED

## 2022-09-16 NOTE — PROGRESS NOTES
Spent 45 mins with pt. His wife, who assists him with his diabetes, was in attendance as well. Dexcom Clarity is still not working. It appears that pt has multiple Dexcom accounts, and is unsure which one he should be using. Pt's wife plans to call Dexcom customer care to help in resetting his log in info if she is unable to figure it out on her own. It was decided to not switch to the Freestyle Portillo since the pt is already familiar with using Dexcom and the Portillo clementina is not compatible on his smart phone. It was also decided to not consider using the InPen since there is worry that the pt may not be able to remember how to use it. Pt has been having trouble with his BG dropping quickly following meals, causing him to have to eat more carbs to prevent low BG. See 9/13 phone note. Per MD s/o calculated based on pt's average insulin usage, that his ICR would be 1:9. Educator and pt decided that he would start using ICR of 1:9 ac meals rather than his current dosing regimen to decrease confusion and decrease hypoglycemia. Pt and wife were advised to call in BGs readings next week if Dexcom Clarity does not get set up.

## 2022-10-12 DIAGNOSIS — E10.65 UNCONTROLLED TYPE 1 DIABETES MELLITUS WITH HYPERGLYCEMIA: ICD-10-CM

## 2022-10-12 RX ORDER — PROCHLORPERAZINE 25 MG/1
SUPPOSITORY RECTAL
Qty: 1 EACH | Refills: 3 | Status: SHIPPED | OUTPATIENT
Start: 2022-10-12 | End: 2023-03-10 | Stop reason: SDUPTHER

## 2022-10-17 RX ORDER — ERGOCALCIFEROL 1.25 MG/1
CAPSULE ORAL
Qty: 6 CAPSULE | Refills: 6 | Status: SHIPPED | OUTPATIENT
Start: 2022-10-17

## 2022-10-19 DIAGNOSIS — E10.49 TYPE 1 DIABETES MELLITUS WITH OTHER DIABETIC NEUROLOGICAL COMPLICATION: ICD-10-CM

## 2022-10-19 RX ORDER — PEN NEEDLE, DIABETIC 29 G X1/2"
NEEDLE, DISPOSABLE MISCELLANEOUS
Qty: 200 EACH | Refills: 12 | Status: SHIPPED | OUTPATIENT
Start: 2022-10-19

## 2022-10-26 ENCOUNTER — TELEPHONE (OUTPATIENT)
Dept: ENDOCRINOLOGY | Facility: CLINIC | Age: 82
End: 2022-10-26

## 2022-10-26 NOTE — TELEPHONE ENCOUNTER
Dexcom reports scanned into phone note. Pt and educator agreed to switching the Lantus to lunch time instead of supper. Per pt's wife Radha, she is currently using following regimen for Novolo g carbs: 5 units   31-50 g carbs: 9 units  51-60 g carbs: 15 units   61-70 g carbs: 20 units   Also using SF of 1:20 over BG of 150.

## 2022-11-14 ENCOUNTER — TELEPHONE (OUTPATIENT)
Dept: ENDOCRINOLOGY | Facility: CLINIC | Age: 82
End: 2022-11-14

## 2022-11-14 NOTE — TELEPHONE ENCOUNTER
Dexcom reports scanned into phone note. Educator, wife, and pt spoke on the phone together. Everyone agreeable that if BG is low and/or recovering from a low, to only take half of meal time insulin dose. Pt is also to make sure that he is taking his Novolog before he eats meals instead of after.

## 2023-01-01 ENCOUNTER — TELEPHONE (OUTPATIENT)
Dept: ENDOCRINOLOGY | Facility: CLINIC | Age: 83
End: 2023-01-01

## 2023-01-03 ENCOUNTER — TREATMENT (OUTPATIENT)
Dept: ENDOCRINOLOGY | Facility: CLINIC | Age: 83
End: 2023-01-03
Payer: MEDICARE

## 2023-01-03 DIAGNOSIS — E10.65 TYPE 1 DIABETES MELLITUS WITH HYPERGLYCEMIA: ICD-10-CM

## 2023-01-03 PROCEDURE — 95251 CONT GLUC MNTR ANALYSIS I&R: CPT | Performed by: INTERNAL MEDICINE

## 2023-01-03 NOTE — PROGRESS NOTES
Pt sent recent Dexcom CGM reports.  MD recommended pt make no changes at this time..  Notified pt via phone.  Dexcom reports and email scanned into this treatment encounter. Put in one CGM interpretation charge for Dr. PORTER

## 2023-02-17 ENCOUNTER — TELEPHONE (OUTPATIENT)
Dept: ENDOCRINOLOGY | Facility: CLINIC | Age: 83
End: 2023-02-17
Payer: MEDICARE

## 2023-02-17 DIAGNOSIS — E10.65 TYPE 1 DIABETES MELLITUS WITH HYPERGLYCEMIA: Primary | ICD-10-CM

## 2023-02-21 RX ORDER — PROCHLORPERAZINE 25 MG/1
1 SUPPOSITORY RECTAL CONTINUOUS
Qty: 1 EACH | Refills: 0 | Status: SHIPPED | OUTPATIENT
Start: 2023-02-21 | End: 2023-03-10 | Stop reason: SDUPTHER

## 2023-02-21 NOTE — TELEPHONE ENCOUNTER
Patient left  today his blood sugars are erratic.    I called and spoke with patient and his wife. They state if his blood sugar is greater than 250 he will take 5-6 units, wait 30 minutes and if it is still rising gives another 5 units. His wife is asking is this the correct thing to do? What interval should they wait to recheck blood sugar and give insulin?    She also states he is having sudden lows where it drops to 60. She gives him something to eat with carbs, says sometimes he has to have  carbs in an hour to try and get it to come back up. She states she will give him sugar tablets, or soda, and peanut butter or protein bars.     She is asking if she is treating his sugars properly. Can you please call them and advise?    **I did try to pull his dexcom readings but they were last uploaded on his end back in january

## 2023-02-22 NOTE — TELEPHONE ENCOUNTER
"I called I talked to pt & his wife.  We do not recommend correct of high blood sugar in between meals, unless it is persistently >400.  Crescencio unit for each 50 mg >150 to the meal dose as correction.  Wait 3-4h to give more insulin to avoid \"stacking\" (insulin on board, not clear fro previous dose ).  As far as hypoglycemia, recommend gl tabs or gel to avoid overeating.  The rule of thumb is 15/15/15: use 15 g CHO, wait 15 min, take another 15 g if still low.  Peanut butter / Prot bar / boost  ( Glucerna ) are great f/u after applying the 15 rule or as HS snack.  Also discussing taking the meal dose before eating. He typically takes after eating, which is likely causing some of the lows 2-3h after  eating.  "

## 2023-02-24 NOTE — TELEPHONE ENCOUNTER
Faxed 9/12 OV to [facility]   
Pt's wife LVM to report the series of low BGs he experienced last night, scanned into phone note. Attempted to call her back but had to M to call back Trinh to discuss. Also patient reports are not showing up on Dexcom Clarity. May need to check to see if pt had Dexcom Clarity clementina on his phone as this could possibly be why it's not showing up.   
Pt's wife called back. His BG was initially 75 at 7 pm (not 175, typo on scanned BGs). But his BG had dropped to 88 1.5 hours He had to eat 146 g of carbs total to bring his BG up. Pt takes his Humalog ac meals according to the following scale:     1-30 g CHO divide by 5  30-49 g CHO divide by 4  50-59 g CHO divide by 3.5   60-70 g CHO divide by 2.5    So far, today his wife is using that same scale but is subtracting 1 unit and his BG has been steadily in the 100's. Advised that she can continue this for now. Pt is scheduled to come in 3/15 re Dr. SILVA's referral to DM education  
Wheelchair

## 2023-03-10 ENCOUNTER — TELEPHONE (OUTPATIENT)
Dept: ENDOCRINOLOGY | Facility: CLINIC | Age: 83
End: 2023-03-10
Payer: MEDICARE

## 2023-03-10 DIAGNOSIS — E10.49 TYPE 1 DIABETES MELLITUS WITH OTHER DIABETIC NEUROLOGICAL COMPLICATION: ICD-10-CM

## 2023-03-10 DIAGNOSIS — E10.65 TYPE 1 DIABETES MELLITUS WITH HYPERGLYCEMIA: ICD-10-CM

## 2023-03-10 DIAGNOSIS — E10.65 UNCONTROLLED TYPE 1 DIABETES MELLITUS WITH HYPERGLYCEMIA: ICD-10-CM

## 2023-03-10 RX ORDER — PROCHLORPERAZINE 25 MG/1
1 SUPPOSITORY RECTAL CONTINUOUS
Qty: 1 EACH | Refills: 0 | Status: SHIPPED | OUTPATIENT
Start: 2023-03-10

## 2023-03-10 RX ORDER — PROCHLORPERAZINE 25 MG/1
1 SUPPOSITORY RECTAL
Qty: 1 EACH | Refills: 3 | Status: SHIPPED | OUTPATIENT
Start: 2023-03-10

## 2023-03-10 RX ORDER — PROCHLORPERAZINE 25 MG/1
SUPPOSITORY RECTAL
Qty: 3 EACH | Refills: 11 | Status: SHIPPED | OUTPATIENT
Start: 2023-03-10

## 2023-03-10 NOTE — TELEPHONE ENCOUNTER
Patient's wife left a voicemail he needs a refill on his transmitter, it goes out on Monday. Also said he qualifies for a new  this year. She did not state where he usually gets his supplies from. I called her back and got NATHALIA WALTERS to call me back. I see we have sent it to [facility] before so I will send it there and will see if they fill it or if it needs to go through DME company.    If patient or wife calls: were they able to get the rx from [facility]? If not, is there a DME company they are already using?

## 2023-03-15 ENCOUNTER — TELEPHONE (OUTPATIENT)
Dept: ENDOCRINOLOGY | Facility: CLINIC | Age: 83
End: 2023-03-15
Payer: MEDICARE

## 2023-03-31 ENCOUNTER — TELEPHONE (OUTPATIENT)
Dept: ENDOCRINOLOGY | Facility: CLINIC | Age: 83
End: 2023-03-31
Payer: MEDICARE

## 2023-03-31 RX ORDER — HYDROCODONE BITARTRATE AND ACETAMINOPHEN 7.5; 325 MG/1; MG/1
TABLET ORAL
COMMUNITY
Start: 2023-01-23 | End: 2023-04-12

## 2023-03-31 RX ORDER — GABAPENTIN 100 MG/1
CAPSULE ORAL
COMMUNITY
Start: 2023-01-06 | End: 2023-04-12

## 2023-03-31 RX ORDER — DONEPEZIL HYDROCHLORIDE 5 MG/1
1 TABLET, FILM COATED ORAL DAILY
COMMUNITY
Start: 2023-01-13

## 2023-04-12 ENCOUNTER — OFFICE VISIT (OUTPATIENT)
Dept: ENDOCRINOLOGY | Facility: CLINIC | Age: 83
End: 2023-04-12
Payer: MEDICARE

## 2023-04-12 VITALS — HEART RATE: 52 BPM | SYSTOLIC BLOOD PRESSURE: 120 MMHG | DIASTOLIC BLOOD PRESSURE: 60 MMHG

## 2023-04-12 DIAGNOSIS — E55.9 VITAMIN D DEFICIENCY: ICD-10-CM

## 2023-04-12 DIAGNOSIS — E78.5 HYPERLIPIDEMIA, UNSPECIFIED HYPERLIPIDEMIA TYPE: ICD-10-CM

## 2023-04-12 DIAGNOSIS — E10.42 TYPE 1 DIABETES MELLITUS WITH DIABETIC POLYNEUROPATHY: Primary | ICD-10-CM

## 2023-04-12 PROCEDURE — 1159F MED LIST DOCD IN RCRD: CPT | Performed by: INTERNAL MEDICINE

## 2023-04-12 PROCEDURE — 1160F RVW MEDS BY RX/DR IN RCRD: CPT | Performed by: INTERNAL MEDICINE

## 2023-04-12 PROCEDURE — 3074F SYST BP LT 130 MM HG: CPT | Performed by: INTERNAL MEDICINE

## 2023-04-12 PROCEDURE — 99214 OFFICE O/P EST MOD 30 MIN: CPT | Performed by: INTERNAL MEDICINE

## 2023-04-12 PROCEDURE — 3078F DIAST BP <80 MM HG: CPT | Performed by: INTERNAL MEDICINE

## 2023-04-12 NOTE — PATIENT INSTRUCTIONS
Keep up the good work!  Continue exercise as able.  Continue diabetes, chol meds & vit D supplements.  Call if blood sugars are running under 100 or over 200.  F/u in 6 months, with labs prior.

## 2023-04-13 RX ORDER — ONDANSETRON 4 MG/1
TABLET, ORALLY DISINTEGRATING ORAL
Start: 2023-04-13

## 2023-04-13 RX ORDER — INSULIN GLARGINE 100 [IU]/ML
INJECTION, SOLUTION SUBCUTANEOUS
Qty: 30 ML | Refills: 4
Start: 2023-04-13

## 2023-04-13 NOTE — PROGRESS NOTES
[facility]        Patient Care Team:  Stefanie Blood APRN as PCP - General (Nurse Practitioner)    Chief Complaint:    Chief Complaint   Patient presents with   • Diabetes     Type 1, , fasting, no insulin yet today         Subjective   Here for diabetes f/u  Blood sugars: in the high 100's. Using the DexCom  Pt has agreed to wear the CGM device and share readings on a regular basis with our office  Taking Lantus @ 1p daily  Exercise program: using walker  Taking vit D QOW    Interval History:     Patient Complaints: R elbow & wrist surg in Jan: helped the pain  Patient Denies: severe hypoglycemia  History taken from: patient & wife    Review of Systems:   Review of Systems   HENT: Negative for trouble swallowing.    Eyes: Negative for blurred vision.   Gastrointestinal: Negative for nausea.   Endocrine: Negative for polyuria.   Musculoskeletal: Positive for gait problem.   Neurological: Positive for weakness and memory problem. Negative for headache.     Gained 2 lb since last visit    Objective     Vital Signs      Vitals:    04/12/23 0850   BP: 120/60   Pulse: 52         Physical Exam:     General Appearance:    Alert, cooperative, in no acute distress   Head:    Normocephalic, without obvious abnormality, atraumatic   Eyes:            Lids and lashes normal, conjunctivae and sclerae normal, no   icterus, no pallor, corneas clear, PERRLA   Throat:   No oral lesions,  oral mucosa moist   Neck:   No adenopathy, supple,  no thyromegaly, no   carotid bruit   Lungs:     Clear    Heart:    Regular rhythm and normal rate   Chest Wall:    No abnormalities observed   Abdomen:     Normal bowel sounds, soft                 Extremities:   Hand muscles atrophy, Charcot feet, no edema               Pulses:   Pulses palpable and equal bilaterally   Skin:   Dry   Neurologic:  DTR absent, unable to feel the 10g monofilament          Results Review:    I have reviewed the patient's new clinical  results, labs & imaging.    Medication Review:   Prior to Admission medications    Medication Sig Start Date End Date Taking? Authorizing Provider   Accu-Chek FastClix Lancets misc Test BS three times daily / DX E 10.49 5/18/21  Yes Diane Garcia MD   acetone, urine, test (Ketostix) strip Pt to check his urine for ketones if his blood sugar is >250 or when ill 11/17/20  Yes Diane Garcia MD   albuterol (PROVENTIL) (2.5 MG/3ML) 0.083% nebulizer solution USE 1 VIAL IN NEBULIZER EVERY 4 HOURS 3/19/21  Yes Noni Torrez MD   ascorbic acid (VITAMIN C) 1000 MG tablet Take 1 tablet by mouth Daily. 11/18/15  Yes Noni Torrez MD   Aspirin Buf,CaCarb-MgCarb-MgO, 81 MG tablet Take 81 mg by mouth Daily.   Yes Noni Torrez MD   atorvastatin (LIPITOR) 20 MG tablet Take 1 tablet by mouth Daily. 4/7/21  Yes Noni Torrez MD   atorvastatin (LIPITOR) 20 MG tablet Take 1 tablet by mouth Daily. 9/6/22  Yes Noni Torrez MD   budesonide (PULMICORT) 0.5 MG/2ML nebulizer solution Take 2 mL by nebulization 2 (Two) Times a Day.   Yes Noni Torrez MD   Continuous Blood Gluc  (Dexcom G6 ) device 1 each Continuous. Pt to use to monitor his blood sugars. 3/10/23  Yes Diane Garcia MD   Continuous Blood Gluc Sensor (Dexcom G6 Sensor) by Other route Every 10 (Ten) Days. 3/10/23  Yes Diane Garcia MD   Continuous Blood Gluc Transmit (Dexcom G6 Transmitter) misc 1 each by Other route Every 3 (Three) Months. 3/10/23  Yes Diane Garcia MD   donepezil (ARICEPT) 5 MG tablet Take 1 tablet by mouth Daily. 1/13/23  Yes Noni Torrez MD   escitalopram (LEXAPRO) 5 MG tablet Take 1 tablet by mouth Daily. 2/16/22  Yes Noni Torrez MD   esomeprazole (nexIUM) 40 MG capsule Take 1 capsule by mouth 2 (Two) Times a Day.   Yes Noni Torrez MD   formoterol (PERFOROMIST) 20 MCG/2ML nebulizer solution Take  by nebulization 2 (Two) Times a Day.   Yes  Noni Torrez MD   furosemide (LASIX) 40 MG tablet Take 1 tablet by mouth Daily.   Yes Noni Torrez MD   Glucagon 3 MG/DOSE powder 1 each into the nostril(s) as directed by provider As Needed (prn for low blood sugar treatment). 10/8/21  Yes Diane Garcia MD   glucose blood test strip To test blood sugars 3x/d 1/18/21  Yes Diane Garcia MD   HumaLOG KwikPen 100 UNIT/ML solution pen-injector INJECT PER SLIDING SCALE MAXIMUM DAILY DOSE OF 55 UNITS 3/21/22  Yes Diane Garcia MD   hydrALAZINE (APRESOLINE) 25 MG tablet TAKE 1 TABLET BY MOUTH ONCE DAILY WITH LUNCH 4/3/21  Yes Noni Torrez MD   Insulin Pen Needle (BD ULTRA-FINE PEN NEEDLES) 29G X 12.7MM misc USE 1 NEW PEN NEEDLE SIX TIMES A DAY 10/19/22  Yes Diane Garcia MD   ipratropium-albuterol (DUO-NEB) 0.5-2.5 mg/3 ml nebulizer Inhale 1 ampule. 3-4 times daily   Yes Noni Torrez MD   isosorbide mononitrate (IMDUR) 60 MG 24 hr tablet Take 1 tablet by mouth Daily. 4/7/21  Yes Noni Torrez MD   Lantus SoloStar 100 UNIT/ML injection pen Inject UP TO 40 units at bedtime dx:e10.65  Patient taking differently: Inject 27 Units under the skin into the appropriate area as directed Every Night. Inject UP TO 40 units at bedtime dx:e10.65 7/28/21  Yes Diane Garcia MD   losartan (COZAAR) 100 MG tablet Take 1 tablet by mouth Daily. 9/6/22  Yes Noni Torrez MD   meloxicam (MOBIC) 7.5 MG tablet TAKE 1 TABLET BY MOUTH NIGHTLY AS NEEDED FOR PAIN (TAKE 1 HOUR PRIOR TO BED) 7/14/22  Yes Noni Torrez MD   memantine (NAMENDA) 10 MG tablet Take 1 tablet by mouth 2 (Two) Times a Day. 6/6/22  Yes Noni Torrez MD   metoprolol tartrate (LOPRESSOR) 25 MG tablet Take 1 tablet by mouth 2 (Two) Times a Day.   Yes Noni Torrez MD   multivitamin (THERAGRAN) tablet tablet Take 1 tablet by mouth Daily.   Yes Noni Torrez MD   mupirocin (BACTROBAN) 2 % ointment APPLY OINTMENT TOPICALLY TO  AFFECTED AREA TWICE DAILY 6/15/21  Yes Noni Torrez MD   nitroglycerin (NITROSTAT) 0.4 MG SL tablet PLACE 1 TABLET BY MOUTH UNDER THE TONGUE EVERY 5 MINUTES AS NEEDED FOR CHEST PAIN 12/9/21  Yes Noni Torrez MD   pantoprazole (PROTONIX) 40 MG EC tablet Take 1 tablet by mouth twice daily 7/21/20  Yes Noni Torrez MD   potassium chloride (K-DUR) 10 MEQ CR tablet Daily. 11/18/15  Yes Noni Torrez MD   Spiriva Respimat 2.5 MCG/ACT aerosol solution inhaler INHALE 2 PUFFS BY MOUTH ONCE DAILY . EACH PUFF IS SEPARATE. HOLD FOR 10 SECONDS IF POSSIBLE 3/19/21  Yes Noni Torrez MD   tamsulosin (FLOMAX) 0.4 MG capsule 24 hr capsule Take 1 capsule by mouth Daily.   Yes Noni Torrez MD   vitamin D (ERGOCALCIFEROL) 1.25 MG (14983 UT) capsule capsule TAKE 1 CAPSULE BY MOUTH EVERY 14 DAYS 10/17/22  Yes Diane Garcia MD   Yupelri 175 MCG/3ML nebulizer solution TAKE 3 MLS BY NEBULIZATION DAILY. 4/22/21  Yes Noni Torrez MD   lidocaine (LIDODERM) 5 % USE 1 TO 3 PATCHES EXTERNALLY ONCE DAILY FOR MAX OF 12 HOURS DAILY. 8/9/21   Noni Torrez MD   losartan (COZAAR) 100 MG tablet Take 1 tablet by mouth Daily. 3/9/20   Noni Torrez MD   memantine (NAMENDA) 5 MG tablet  3/22/21   Noni Torrez MD   memantine (NAMENDA) 5 MG tablet Take 1 tablet by mouth. 4/28/22   Noni Torrez MD   nystatin (MYCOSTATIN) 050373 UNIT/ML suspension TAKE 2 ML BY MOUTH 4 TIMES DAILY . SWISH FOR 30 SECONDS THEN SWALLOW. DO NOT EAT OR DRINK FOR 30 MINUTES AFTERWARDS 3/19/21   Noni Torrez MD   pantoprazole (PROTONIX) 40 MG EC tablet Take 1 tablet by mouth. 4/1/22   Noni Torrez MD   traMADol (ULTRAM) 50 MG tablet TAKE 1 TABLET BY MOUTH IN THE AFTERNOON AND 2 IN THE EVENING . DO NOT EXCEED 3 PER 24 HOURS 11/30/21   Noni Torrez MD   Zofran ODT 4 MG disintegrating tablet Pt to place one tab under tongue every 12 hours as needed for nausea.  Dx: E10.65 11/20/20   Diane Garcia MD       Lab Results (most recent)     None        Lab Results   Component Value Date    HGBA1C 7.9 (H) 02/22/2023    HGBA1C 8.9 (H) 08/24/2022    HGBA1C 8.2 (H) 02/09/2022      Lab Results   Component Value Date    GLUCOSE 286 (H) 07/09/2019    BUN 36 (H) 02/09/2022    CREATININE 0.81 02/09/2022    CREATININE 24.1 02/09/2022    EGFRIFNONA 72 07/09/2019    BCR 44.8 (H) 02/09/2022    K 4.4 02/09/2022    CO2 27 02/09/2022    CALCIUM 8.8 02/09/2022    ALBUMIN 3.8 02/09/2022    LABIL2 1.3 02/09/2022    AST 34 02/09/2022    ALT 28 02/09/2022    CHOL 116 07/09/2019    LDL 95 07/21/2020    HDL 55 07/21/2020    TRIG 109 07/21/2020     Lab Results   Component Value Date    TSH 1.410 02/09/2022    FREET4 1.24 08/24/2022    DEHD53CU 63.4 08/24/2022     A1C 7.9%; microalb/cr ratio 201; cr 0.76, BUN 24, K 4.5, gl 212, Ca 8.7, ALT 32; TSH 1.83;  Chol 143, Trigl 73, LDL 67, HDL 62; WBC 6.71, Hb 13.4 on 2/22/2023.    Assessment & Plan     Diagnoses and all orders for this visit:    1. 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 Hyperlipidemia, unspecified hyperlipidemia type  -     Comprehensive Metabolic Panel; Future    3. Vitamin D deficiency  -     Vitamin D,25-Hydroxy; Future    Glucose control improved. Lipids stable. Will check vit D status next visit.    Continue exercise as able.  Continue diabetes & chol meds & vit D supplements.  Call if blood sugars are running under 100 or over 200.        Diane Garcia MD  04/13/23  16:18 EDT

## 2023-04-18 ENCOUNTER — TELEPHONE (OUTPATIENT)
Dept: ENDOCRINOLOGY | Facility: CLINIC | Age: 83
End: 2023-04-18
Payer: MEDICARE

## 2023-04-18 NOTE — TELEPHONE ENCOUNTER
DWO for Dexcom initiated in paracte/[facility]. In Dr Garcia's inbox for signature  Will fax once signed.

## 2023-04-18 NOTE — TELEPHONE ENCOUNTER
Patient's wife left VM that they are unable to get his dexcom from pharmacy. He is medicare so this probably needs to go through a DME company. Trying to get a hold of them to let her know this.

## 2023-04-20 ENCOUNTER — TELEPHONE (OUTPATIENT)
Dept: ENDOCRINOLOGY | Facility: CLINIC | Age: 83
End: 2023-04-20
Payer: MEDICARE

## 2023-05-25 NOTE — TELEPHONE ENCOUNTER
Caller: WALI MURGUIA    Relationship to patient: Emergency Contact    Best call back number: [phone]    Patient is needing: PT'S WIFE CALLING STATING THAT HE WAS IN THE HOSPITAL AND WAS GIVEN STEROIDS IN A IV AND SHE UPED HIS DOSAGE ON HIS LANTUS FROM 30 TO 33 AFTER THE LAST THREE WEEKS AND WAS WANTING TO SEE IF THE DR WANTED HER TO DO SOMETHING DIFFERENT AND SHE HAS BEEN ABLE TO GET HIS BLOOD SUGAR UNDER 300 BUT IT HAS BEEN RUNNING HIGH

## 2023-07-24 ENCOUNTER — TELEPHONE (OUTPATIENT)
Dept: ENDOCRINOLOGY | Facility: CLINIC | Age: 83
End: 2023-07-24
Payer: MEDICARE

## 2023-07-24 NOTE — TELEPHONE ENCOUNTER
Called & talked to wife.   She is frustrated, but there is a learning curve for them to get to know him.  I don't have any authority at the unit. Would be glad to talk to someone if they want to talk to me.  It is better to increase insulin slowly.  Let's see how the #'s look like in another week after the increase in HS Lantus dose.

## 2023-07-24 NOTE — TELEPHONE ENCOUNTER
He was admitted in a memory care unit on June 22 when he went in with a A1C was 7.9 and in 30 days on the Dexcom his A1C 8.7. When she goes and see him the BS are 300's., There protocol is to give Humalog 10 units at meals time. At 1:00 33 units of Lantus. Telling her at night time giving 8 units of Lantus. They are getting ready to increase the night time Lantus up to 11 units,  patterns are running high between 7:25 am and 11 pm. He had 46 high events in the last 30 days. Wife has expressed concern about the insulin needing raised and is asking if you feel this is a correct dose. Please notify wife with answer.

## 2023-08-28 ENCOUNTER — TELEPHONE (OUTPATIENT)
Dept: ENDOCRINOLOGY | Facility: CLINIC | Age: 83
End: 2023-08-28
Payer: MEDICARE

## 2023-08-28 NOTE — TELEPHONE ENCOUNTER
Caller: Radha, wife [phone]    She left  stating she has a meeting with his NP @ 2 pm today about his blood sugars and treatment. She states Jered is currently in a memory care facility and they are letting his blood sugars get up to 400 before they administer insulin. She is asking at what point should they start administering the insulin?    She states he is a brittle diabetic and if his blood sugars get over 400 it is hard to bring him down and if they get below 90 it is hard to bring him up.     I called and spoke with Radha. She states they give him insulin according to his sliding scale when he eats. She is wanting them to do 5-6 units plus sliding scale each meal because they do not account for what he is going to eat/the carbs he will be eating. She is asking if she can have something in writing that she can take to the NP/Facility on what Dr Garcia is recommending on his insulin.

## 2023-08-31 NOTE — TELEPHONE ENCOUNTER
To whom it may concern:    Mr Jered Adkins has type 1 Diabetes since 1958 & had been under my care for over 20 y.  He did well on a basal insulin ( Lantus ) & meal time bolus insulin ( Humalog ) according to his intake.  I realize he can no longer decide his Humalog dose in the present circumstances.  However, it may be possible to avoid major blood sugar swings by using a small fixed meal dose ( 5 units ) & then add on the sliding scale to that.  Please feel free to contact me if questions or concerns.    Regards,    Diane Garcia MD    [facility]    [phone]

## 2023-10-03 NOTE — TELEPHONE ENCOUNTER
Hub staff attempted to follow warm transfer process and was unsuccessful     Caller: WALI MURGUIA    Relationship to patient: Emergency Contact    Best call back number: [phone]     Patient is needing: PT IS NEEDING MORE INFORMATION ON HOW TO GET A PUMP AND WHAT STEPS THEY NEED TO TAKE TO GET DEXCOM  SINCE HE DOESN'T QUALIFY FOR ANOTHER 5 YEARS. PT IS HAPPY TO PAY FOR IT BUT MIGHT STILL NEED PRESCRIPTION, PLEASE CHECK AND ADVISE.

## 2023-10-18 NOTE — TELEPHONE ENCOUNTER
Caller: Jered Adkins    Relationship to patient: Self    Best call back number: [phone]     Patient is needing: [employer] NEEDS CHART NOTES SENT OVER     PHONE NUMBER [phone]  [phone]